# Patient Record
Sex: MALE | Race: WHITE | NOT HISPANIC OR LATINO | Employment: FULL TIME | URBAN - NONMETROPOLITAN AREA
[De-identification: names, ages, dates, MRNs, and addresses within clinical notes are randomized per-mention and may not be internally consistent; named-entity substitution may affect disease eponyms.]

---

## 2017-03-24 DIAGNOSIS — M79.672 BILATERAL FOOT PAIN: Primary | ICD-10-CM

## 2017-03-24 DIAGNOSIS — M79.671 BILATERAL FOOT PAIN: Primary | ICD-10-CM

## 2017-03-28 ENCOUNTER — OFFICE VISIT (OUTPATIENT)
Dept: ORTHOPEDIC SURGERY | Facility: CLINIC | Age: 32
End: 2017-03-28

## 2017-03-28 VITALS — HEIGHT: 66 IN | RESPIRATION RATE: 18 BRPM | WEIGHT: 285 LBS | BODY MASS INDEX: 45.8 KG/M2

## 2017-03-28 DIAGNOSIS — R22.43 MASS OF BOTH FEET: Primary | ICD-10-CM

## 2017-03-28 PROCEDURE — 99204 OFFICE O/P NEW MOD 45 MIN: CPT | Performed by: PODIATRIST

## 2017-03-28 RX ORDER — BUPRENORPHINE HYDROCHLORIDE, NALOXONE HYDROCHLORIDE 8; 2 MG/1; MG/1
FILM, SOLUBLE BUCCAL; SUBLINGUAL
Refills: 0 | COMMUNITY
Start: 2017-03-11 | End: 2017-05-10 | Stop reason: HOSPADM

## 2017-03-28 NOTE — PROGRESS NOTES
Subjective   Patient ID: J Luis Estrada is a 31 y.o. male   Pain, Consult, and Numbness of the Left Foot (Patient is being seen today at the request of Dr. MARIYA Logan.) and Pain and Numbness of the Right Foot   he presents the chief complaint of 2 areas of pain on the bottom of the foot.  He also complains of numbness and tingling and burning and odd sensations in his feet.  He denies being diabetic and states he's been tested for it.  He states his father and family have had neuropathy.  He states he really like to have these place in the bottom his feet removed.  States that he can't wear any shoes and is having to walk on the outside of his foot which is making other things hurt.    History of Present Illness    Review of Systems   Constitutional: Negative for diaphoresis, fever and unexpected weight change.   HENT: Negative for dental problem and sore throat.    Eyes: Negative for visual disturbance.   Respiratory: Negative for shortness of breath.    Cardiovascular: Negative for chest pain.   Gastrointestinal: Negative for abdominal pain, constipation, diarrhea, nausea and vomiting.   Genitourinary: Negative for difficulty urinating and frequency.   Musculoskeletal: Positive for arthralgias.   Neurological: Negative for headaches.   Hematological: Does not bruise/bleed easily.       Past Medical History:   Diagnosis Date   • DVT (deep venous thrombosis)    • Hypertension         Past Surgical History:   Procedure Laterality Date   • TONSILLECTOMY AND ADENOIDECTOMY         Allergies   Allergen Reactions   • Penicillins        @/ reviewed@    Objective   Physical Exam   Constitutional: He is oriented to person, place, and time. He appears well-developed and well-nourished.   HENT:   Head: Normocephalic.   Eyes: Pupils are equal, round, and reactive to light.   Neurological: He is alert and oriented to person, place, and time.   Skin: Skin is warm.   Psychiatric: He has a normal mood and affect. His  behavior is normal.   Vitals reviewed.    Ortho Exam  Ortho Exam  Bilateral Lower extremity exam:  Vascular: Pedal pulses are palpable, pedal hair is noted, capillary refill time is brisk  Neuro: Gross sensations intact, there doesn't appear to be any obvious loss of protective sensation  Derm: On the right plantar foot in the distal arch under the first metatarsal shaft there is a very large elevated, raised soft tissue mass that measures roughly 3 cm in diameter it seems to be attached to the underlying subcutaneous tissue but is mobile and moves.  It's not extremely firm and seems to slightly eliminate light.  On the left foot there is an area that appears to be 2 of these together potentially one larger multiloculated area under the first metatarsal and towards the first metatarsophalangeal joint sesamoids.  There also appears to be something resembling these under the fifth metatarsal head and base on the left foot.  MSK: He is very large in stature.  He has dorsal digital contractures of digits 2 through 5 bilaterally.  Range of motion 0 normal.  Manual muscle testing is normal.    Assessment/Plan  neuropathy of unknown origin, bilateral foot masses/tumors  Independent Review of Radiographic Studies:      Laboratory and Other Studies:     Medical Decision Making:        Procedures  [] No procedures were performed in office today.    J Luis was seen today for pain, consult, numbness, pain and numbness.    Diagnoses and all orders for this visit:    Mass of both feet  -     MRI Foot Left Without Contrast; Future        Recommendations/Plan:  Discussed with him that I would like to look into the cause of his neuropathy.  This point he states he's not diabetic but may need a more involved workup for that.  He has a history of DVT so that will have to be managed if we do continue with the potential surgery.  I recommend him first that we MRI the left foot with contrast to try to get an idea of exactly what these  are.  If these are potentially rheumatoid nodules I think it's a good idea fist send him for that workup.  If they're just ganglion cyst in nature or lesions of that sort we can most likely proceed with excision.  I explained him the difficulty in getting plantar foot wounds to heal and prolonged nonweightbearing postoperatively.  I'll send him for MRI and see him back after that.  I offered him fracture boots to try and if these will help offload his feet make him more comfortable in the meantime he can have him.    No Follow-up on file.  Patient agreeable to call or return sooner for any concerns.

## 2017-04-14 ENCOUNTER — HOSPITAL ENCOUNTER (OUTPATIENT)
Dept: MRI IMAGING | Facility: HOSPITAL | Age: 32
Discharge: HOME | End: 2017-04-14
Attending: PODIATRIST | Admitting: PODIATRIST

## 2017-04-14 DIAGNOSIS — R22.43 MASS OF BOTH FEET: ICD-10-CM

## 2017-04-14 PROCEDURE — 73718 MRI LOWER EXTREMITY W/O DYE: CPT

## 2017-04-20 ENCOUNTER — OFFICE VISIT (OUTPATIENT)
Dept: ORTHOPEDIC SURGERY | Facility: CLINIC | Age: 32
End: 2017-04-20

## 2017-04-20 VITALS — WEIGHT: 307 LBS | BODY MASS INDEX: 35.52 KG/M2 | HEIGHT: 78 IN | RESPIRATION RATE: 18 BRPM

## 2017-04-20 DIAGNOSIS — R22.43 MASS OF BOTH FEET: Primary | ICD-10-CM

## 2017-04-20 DIAGNOSIS — I82.433 DEEP VEIN THROMBOSIS (DVT) OF POPLITEAL VEIN OF BOTH LOWER EXTREMITIES, UNSPECIFIED CHRONICITY (HCC): ICD-10-CM

## 2017-04-20 DIAGNOSIS — M72.2 PLANTAR FASCIAL FIBROMATOSIS OF BOTH FEET: ICD-10-CM

## 2017-04-20 DIAGNOSIS — G62.9 NEUROPATHY: ICD-10-CM

## 2017-04-20 PROCEDURE — 99214 OFFICE O/P EST MOD 30 MIN: CPT | Performed by: PODIATRIST

## 2017-04-20 RX ORDER — PREDNISONE 10 MG/1
TABLET ORAL
COMMUNITY
Start: 2017-04-13 | End: 2017-05-03

## 2017-04-20 RX ORDER — SODIUM CHLORIDE 0.9 % (FLUSH) 0.9 %
1-10 SYRINGE (ML) INJECTION AS NEEDED
Status: CANCELLED | OUTPATIENT
Start: 2017-04-20

## 2017-04-20 RX ORDER — LAMOTRIGINE 150 MG/1
100 TABLET ORAL DAILY
COMMUNITY
Start: 2017-04-12

## 2017-04-20 RX ORDER — SODIUM CHLORIDE, SODIUM LACTATE, POTASSIUM CHLORIDE, CALCIUM CHLORIDE 600; 310; 30; 20 MG/100ML; MG/100ML; MG/100ML; MG/100ML
100 INJECTION, SOLUTION INTRAVENOUS CONTINUOUS
Status: CANCELLED | OUTPATIENT
Start: 2017-04-20

## 2017-04-20 NOTE — PROGRESS NOTES
Subjective   Patient ID: J Luis Estrada is a 31 y.o. male   Follow-up and Pain of the Right Foot and Follow-up and Pain of the Left Foot   comes in today for follow-up on his left foot masses as well as his neuropathy.  His mother comes in with him today.  I had artery talk to her on the phone prior to her coming in.  He's had blood work from Dr. Mo.  still requesting that we remove the knots out of the bottom of his foot.    History of Present Illness    Review of Systems   Constitutional: Negative for fever.   HENT: Negative for voice change.    Eyes: Negative for visual disturbance.   Respiratory: Negative for shortness of breath.    Cardiovascular: Negative for chest pain.   Gastrointestinal: Negative for abdominal distention and abdominal pain.   Genitourinary: Negative for dysuria.   Musculoskeletal: Positive for arthralgias. Negative for gait problem and joint swelling.   Skin: Negative for rash.   Neurological: Negative for speech difficulty.   Hematological: Does not bruise/bleed easily.   Psychiatric/Behavioral: Negative for confusion.       Past Medical History:   Diagnosis Date   • Bipolar affective disorder, currently active     ON MEDICATION   • DVT (deep venous thrombosis)    • Hypertension    • Neuropathy         Past Surgical History:   Procedure Laterality Date   • ADENOIDECTOMY     • TONSILLECTOMY AND ADENOIDECTOMY         Allergies   Allergen Reactions   • Penicillins            Objective   Physical Exam   Constitutional: He is oriented to person, place, and time. He appears well-developed and well-nourished.   HENT:   Head: Normocephalic.   Eyes: Pupils are equal, round, and reactive to light.   Neurological: He is alert and oriented to person, place, and time.   Skin: Skin is warm.   Psychiatric: He has a normal mood and affect. His behavior is normal.   Vitals reviewed.   heart: Regular rate and rhythm  Lungs: CTA bilaterally  Name: Obese, nontender, soft  PERRLA  Cranial nerves II  through XII intact  Ortho Exam  Ortho Exam  Bilateral Lower extremity exam:  Vascular: Pedal pulses are palpable, pedal hair is noted, capillary refill time is brisk  Neuro: Gross sensations intact, there doesn't appear to be any obvious loss of protective sensation  Derm: On the right plantar foot in the distal arch under the first metatarsal shaft there is a very large elevated, raised soft tissue mass that measures roughly 3 cm in diameter it seems to be attached to the underlying subcutaneous tissue but is mobile and moves. It's not extremely firm and seems to slightly eliminate light. On the left foot there is an area that appears to be 2 of these together potentially one larger multiloculated area under the first metatarsal and towards the first metatarsophalangeal joint sesamoids. There also appears to be something resembling these under the fifth metatarsal head and base on the left foot.  MSK: He is very large in stature. He has dorsal digital contractures of digits 2 through 5 bilaterally. Range of motion 0 normal. Manual muscle testing is normal  Assessment/Plan  bilateral plantar fibromatosis, neuropathy of unknown origin  Independent Review of Radiographic Studies:    His MRI the left foot shows multiple thickenings within the plantar fascia that appear to be multiple large plantar fibromas  Laboratory and Other Studies:   I reviewed his labs from Dr. Logan.  It appears that there is no obvious diabetes.  His hemoglobin A1c is a 5.6.  Medical Decision Making:        Procedures  [] No procedures were performed in office today.    J Luis was seen today for follow-up, pain, follow-up and pain.    Diagnoses and all orders for this visit:    Mass of both feet  -     Case Request; Standing  -     CBC and Differential; Future  -     Comprehensive metabolic panel; Future  -     sodium chloride 0.9 % flush 1-10 mL; Infuse 1-10 mL into a venous catheter As Needed for Line Care.  -     lactated ringers infusion;  Infuse 100 mL/hr into a venous catheter Continuous.  -     clindamycin (CLEOCIN) 900 mg in dextrose (D5W) 5 % 100 mL IVPB; Infuse 900 mg into a venous catheter 1 (One) Time.  -     Case Request    Plantar fascial fibromatosis of both feet    Neuropathy  -     Ambulatory Referral to Neurology    Deep vein thrombosis (DVT) of popliteal vein of both lower extremities, unspecified chronicity  -     enoxaparin (LOVENOX) injection 40 mg; Inject 0.27 mL under the skin Daily.    Other orders  -     Follow Anesthesia Guidelines / Standing Orders; Future  -     Provide instructions to patient regarding NPO status  -     Obtain informed consent  -     Clorhexidine skin prep  -     Follow Anesthesia Guidelines / Standing Orders; Standing  -     Verify NPO Status; Standing  -     Verify informed consent; Standing  -     MONSERRAT hose- To be placed on patient in pre-op; Standing  -     SCD (sequential compression device)- to be placed on patient in Pre-op; Standing  -     Obtain informed consent (if not collected inpatient or PAT); Standing  -     Insert Peripheral IV; Standing  -     Saline Lock & Maintain IV Access; Standing          Recommendations/Plan:  His mother finally made it today and we sat down had a long discussion with all his problems.  We also reviewed his MRI.  In regards to the neuropathy I discussed with them all the different causes a neuropathy.  I am going to send him to Dr. Ventura to see if he has any additional or further recommendations on his neuropathy I did explain that this may be idiopathic in can also be calls for multiple other underlying health issues.  He's very adamant about having the masses in the bottom of his feet removed due to pain.  I had a long discussion with him as well as his mother about the surgical procedure and the potential complications of wound healing.  I explained the need to be nonweightbearing with a splint, crutches, knee scooter or walker.  They state they already made  arrangements in his house to help limit his walking.  We discussed all the risks versus benefits as well as potential complications.  Discussed recurrence.  If I can find somewhere close and convenient for him I will send him for a few doses of radiation afterwards to help prevent recurrence.  They seemed express understanding and are agreeable and wished to proceed with surgery.  I'll have to put him on DVT prophylaxis due to his history of DVTs.  He is on Suboxone Soma pain management with him the most likely be difficult.  Return post op.  Patient agreeable to call or return sooner for any concerns.

## 2017-05-03 ENCOUNTER — APPOINTMENT (OUTPATIENT)
Dept: PREADMISSION TESTING | Facility: HOSPITAL | Age: 32
End: 2017-05-03

## 2017-05-03 VITALS — HEIGHT: 78 IN | BODY MASS INDEX: 33.55 KG/M2 | WEIGHT: 290 LBS

## 2017-05-03 DIAGNOSIS — R22.43 MASS OF BOTH FEET: ICD-10-CM

## 2017-05-03 LAB
ALBUMIN SERPL-MCNC: 4.6 G/DL (ref 3.5–5)
ALBUMIN/GLOB SERPL: 1 G/DL (ref 1–2)
ALP SERPL-CCNC: 92 U/L (ref 38–126)
ALT SERPL W P-5'-P-CCNC: 133 U/L (ref 13–69)
ANION GAP SERPL CALCULATED.3IONS-SCNC: 15.7 MMOL/L
AST SERPL-CCNC: 74 U/L (ref 15–46)
BASOPHILS # BLD AUTO: 0.04 10*3/MM3 (ref 0–0.2)
BASOPHILS NFR BLD AUTO: 0.6 % (ref 0–2.5)
BILIRUB SERPL-MCNC: 0.7 MG/DL (ref 0.2–1.3)
BUN BLD-MCNC: 14 MG/DL (ref 7–20)
BUN/CREAT SERPL: 14 (ref 6.3–21.9)
CALCIUM SPEC-SCNC: 9.7 MG/DL (ref 8.4–10.2)
CHLORIDE SERPL-SCNC: 104 MMOL/L (ref 98–107)
CO2 SERPL-SCNC: 28 MMOL/L (ref 26–30)
CREAT BLD-MCNC: 1 MG/DL (ref 0.6–1.3)
DEPRECATED RDW RBC AUTO: 46.9 FL (ref 37–54)
EOSINOPHIL # BLD AUTO: 0.2 10*3/MM3 (ref 0–0.7)
EOSINOPHIL NFR BLD AUTO: 2.9 % (ref 0–7)
ERYTHROCYTE [DISTWIDTH] IN BLOOD BY AUTOMATED COUNT: 13.8 % (ref 11.5–14.5)
GFR SERPL CREATININE-BSD FRML MDRD: 86 ML/MIN/1.73
GLOBULIN UR ELPH-MCNC: 4.4 GM/DL
GLUCOSE BLD-MCNC: 93 MG/DL (ref 74–98)
HCT VFR BLD AUTO: 47.6 % (ref 42–52)
HGB BLD-MCNC: 15.6 G/DL (ref 14–18)
IMM GRANULOCYTES # BLD: 0.03 10*3/MM3 (ref 0–0.06)
IMM GRANULOCYTES NFR BLD: 0.4 % (ref 0–0.6)
LYMPHOCYTES # BLD AUTO: 2.17 10*3/MM3 (ref 0.6–3.4)
LYMPHOCYTES NFR BLD AUTO: 31.3 % (ref 10–50)
MCH RBC QN AUTO: 30.1 PG (ref 27–31)
MCHC RBC AUTO-ENTMCNC: 32.8 G/DL (ref 30–37)
MCV RBC AUTO: 91.7 FL (ref 80–94)
MONOCYTES # BLD AUTO: 0.67 10*3/MM3 (ref 0–0.9)
MONOCYTES NFR BLD AUTO: 9.7 % (ref 0–12)
NEUTROPHILS # BLD AUTO: 3.82 10*3/MM3 (ref 2–6.9)
NEUTROPHILS NFR BLD AUTO: 55.1 % (ref 37–80)
NRBC BLD MANUAL-RTO: 0 /100 WBC (ref 0–0)
PLATELET # BLD AUTO: 214 10*3/MM3 (ref 130–400)
PMV BLD AUTO: 9.6 FL (ref 6–12)
POTASSIUM BLD-SCNC: 4.7 MMOL/L (ref 3.5–5.1)
PROT SERPL-MCNC: 9 G/DL (ref 6.3–8.2)
RBC # BLD AUTO: 5.19 10*6/MM3 (ref 4.7–6.1)
SODIUM BLD-SCNC: 143 MMOL/L (ref 137–145)
WBC NRBC COR # BLD: 6.93 10*3/MM3 (ref 4.8–10.8)

## 2017-05-03 PROCEDURE — 85025 COMPLETE CBC W/AUTO DIFF WBC: CPT | Performed by: PODIATRIST

## 2017-05-03 PROCEDURE — 36415 COLL VENOUS BLD VENIPUNCTURE: CPT

## 2017-05-03 PROCEDURE — 80053 COMPREHEN METABOLIC PANEL: CPT | Performed by: PODIATRIST

## 2017-05-03 PROCEDURE — 93005 ELECTROCARDIOGRAM TRACING: CPT | Performed by: PODIATRIST

## 2017-05-03 RX ORDER — LORAZEPAM 0.5 MG/1
0.5 TABLET ORAL EVERY 8 HOURS PRN
COMMUNITY

## 2017-05-10 ENCOUNTER — ANESTHESIA (OUTPATIENT)
Dept: PERIOP | Facility: HOSPITAL | Age: 32
End: 2017-05-10

## 2017-05-10 ENCOUNTER — ANESTHESIA EVENT (OUTPATIENT)
Dept: PERIOP | Facility: HOSPITAL | Age: 32
End: 2017-05-10

## 2017-05-10 ENCOUNTER — HOSPITAL ENCOUNTER (OUTPATIENT)
Facility: HOSPITAL | Age: 32
Setting detail: HOSPITAL OUTPATIENT SURGERY
Discharge: HOME | End: 2017-05-10
Attending: PODIATRIST | Admitting: PODIATRIST

## 2017-05-10 VITALS
HEART RATE: 71 BPM | OXYGEN SATURATION: 95 % | TEMPERATURE: 99.8 F | RESPIRATION RATE: 16 BRPM | SYSTOLIC BLOOD PRESSURE: 95 MMHG | DIASTOLIC BLOOD PRESSURE: 58 MMHG

## 2017-05-10 DIAGNOSIS — R22.43 MASS OF BOTH FEET: ICD-10-CM

## 2017-05-10 PROCEDURE — 28041 EXC FOOT/TOE TUM DEP 1.5CM/>: CPT | Performed by: PODIATRIST

## 2017-05-10 PROCEDURE — 25010000002 ONDANSETRON PER 1 MG: Performed by: NURSE ANESTHETIST, CERTIFIED REGISTERED

## 2017-05-10 PROCEDURE — 25010000002 KETOROLAC TROMETHAMINE PER 15 MG: Performed by: NURSE ANESTHETIST, CERTIFIED REGISTERED

## 2017-05-10 PROCEDURE — 25010000002 MIDAZOLAM PER 1 MG: Performed by: NURSE ANESTHETIST, CERTIFIED REGISTERED

## 2017-05-10 PROCEDURE — 25010000002 PROPOFOL 200 MG/20ML EMULSION: Performed by: NURSE ANESTHETIST, CERTIFIED REGISTERED

## 2017-05-10 PROCEDURE — 25010000002 FENTANYL CITRATE (PF) 100 MCG/2ML SOLUTION: Performed by: NURSE ANESTHETIST, CERTIFIED REGISTERED

## 2017-05-10 PROCEDURE — 25010000002 DEXAMETHASONE PER 1 MG: Performed by: NURSE ANESTHETIST, CERTIFIED REGISTERED

## 2017-05-10 RX ORDER — BACITRACIN 50000 [IU]/1
INJECTION, POWDER, FOR SOLUTION INTRAMUSCULAR AS NEEDED
Status: DISCONTINUED | OUTPATIENT
Start: 2017-05-10 | End: 2017-05-10 | Stop reason: HOSPADM

## 2017-05-10 RX ORDER — ONDANSETRON 2 MG/ML
4 INJECTION INTRAMUSCULAR; INTRAVENOUS ONCE AS NEEDED
Status: DISCONTINUED | OUTPATIENT
Start: 2017-05-10 | End: 2017-05-10 | Stop reason: HOSPADM

## 2017-05-10 RX ORDER — OXYCODONE AND ACETAMINOPHEN 7.5; 325 MG/1; MG/1
1 TABLET ORAL EVERY 6 HOURS PRN
Qty: 30 TABLET | Refills: 0 | Status: SHIPPED | OUTPATIENT
Start: 2017-05-10 | End: 2018-03-16

## 2017-05-10 RX ORDER — BUPIVACAINE HYDROCHLORIDE 5 MG/ML
INJECTION, SOLUTION EPIDURAL; INTRACAUDAL AS NEEDED
Status: DISCONTINUED | OUTPATIENT
Start: 2017-05-10 | End: 2017-05-10 | Stop reason: HOSPADM

## 2017-05-10 RX ORDER — MIDAZOLAM HYDROCHLORIDE 1 MG/ML
INJECTION INTRAMUSCULAR; INTRAVENOUS AS NEEDED
Status: DISCONTINUED | OUTPATIENT
Start: 2017-05-10 | End: 2017-05-10 | Stop reason: SURG

## 2017-05-10 RX ORDER — SODIUM CHLORIDE, SODIUM LACTATE, POTASSIUM CHLORIDE, CALCIUM CHLORIDE 600; 310; 30; 20 MG/100ML; MG/100ML; MG/100ML; MG/100ML
100 INJECTION, SOLUTION INTRAVENOUS CONTINUOUS
Status: DISCONTINUED | OUTPATIENT
Start: 2017-05-10 | End: 2017-05-10 | Stop reason: HOSPADM

## 2017-05-10 RX ORDER — FENTANYL CITRATE 50 UG/ML
INJECTION, SOLUTION INTRAMUSCULAR; INTRAVENOUS AS NEEDED
Status: DISCONTINUED | OUTPATIENT
Start: 2017-05-10 | End: 2017-05-10 | Stop reason: SURG

## 2017-05-10 RX ORDER — PROMETHAZINE HYDROCHLORIDE 25 MG/1
25 SUPPOSITORY RECTAL ONCE AS NEEDED
Status: DISCONTINUED | OUTPATIENT
Start: 2017-05-10 | End: 2017-05-10 | Stop reason: HOSPADM

## 2017-05-10 RX ORDER — PROPOFOL 10 MG/ML
INJECTION, EMULSION INTRAVENOUS AS NEEDED
Status: DISCONTINUED | OUTPATIENT
Start: 2017-05-10 | End: 2017-05-10 | Stop reason: SURG

## 2017-05-10 RX ORDER — OXYCODONE HYDROCHLORIDE AND ACETAMINOPHEN 5; 325 MG/1; MG/1
TABLET ORAL
Status: COMPLETED
Start: 2017-05-10 | End: 2017-05-10

## 2017-05-10 RX ORDER — DEXAMETHASONE SODIUM PHOSPHATE 4 MG/ML
INJECTION, SOLUTION INTRA-ARTICULAR; INTRALESIONAL; INTRAMUSCULAR; INTRAVENOUS; SOFT TISSUE AS NEEDED
Status: DISCONTINUED | OUTPATIENT
Start: 2017-05-10 | End: 2017-05-10 | Stop reason: SURG

## 2017-05-10 RX ORDER — PROMETHAZINE HYDROCHLORIDE 12.5 MG/1
25 TABLET ORAL ONCE AS NEEDED
Status: DISCONTINUED | OUTPATIENT
Start: 2017-05-10 | End: 2017-05-10 | Stop reason: HOSPADM

## 2017-05-10 RX ORDER — LIDOCAINE HYDROCHLORIDE 10 MG/ML
INJECTION, SOLUTION INFILTRATION; PERINEURAL AS NEEDED
Status: DISCONTINUED | OUTPATIENT
Start: 2017-05-10 | End: 2017-05-10 | Stop reason: HOSPADM

## 2017-05-10 RX ORDER — PROMETHAZINE HYDROCHLORIDE 25 MG/ML
6.25 INJECTION, SOLUTION INTRAMUSCULAR; INTRAVENOUS ONCE AS NEEDED
Status: DISCONTINUED | OUTPATIENT
Start: 2017-05-10 | End: 2017-05-10 | Stop reason: HOSPADM

## 2017-05-10 RX ORDER — ONDANSETRON 2 MG/ML
INJECTION INTRAMUSCULAR; INTRAVENOUS AS NEEDED
Status: DISCONTINUED | OUTPATIENT
Start: 2017-05-10 | End: 2017-05-10 | Stop reason: SURG

## 2017-05-10 RX ORDER — MEPERIDINE HYDROCHLORIDE 25 MG/ML
12.5 INJECTION INTRAMUSCULAR; INTRAVENOUS; SUBCUTANEOUS
Status: DISCONTINUED | OUTPATIENT
Start: 2017-05-10 | End: 2017-05-10 | Stop reason: HOSPADM

## 2017-05-10 RX ORDER — KETOROLAC TROMETHAMINE 30 MG/ML
INJECTION, SOLUTION INTRAMUSCULAR; INTRAVENOUS AS NEEDED
Status: DISCONTINUED | OUTPATIENT
Start: 2017-05-10 | End: 2017-05-10 | Stop reason: SURG

## 2017-05-10 RX ORDER — SODIUM CHLORIDE 0.9 % (FLUSH) 0.9 %
1-10 SYRINGE (ML) INJECTION AS NEEDED
Status: DISCONTINUED | OUTPATIENT
Start: 2017-05-10 | End: 2017-05-10 | Stop reason: HOSPADM

## 2017-05-10 RX ORDER — BUPIVACAINE HYDROCHLORIDE 5 MG/ML
INJECTION, SOLUTION EPIDURAL; INTRACAUDAL
Status: COMPLETED
Start: 2017-05-10 | End: 2017-05-10

## 2017-05-10 RX ADMIN — DEXAMETHASONE SODIUM PHOSPHATE 4 MG: 4 INJECTION, SOLUTION INTRAMUSCULAR; INTRAVENOUS at 09:21

## 2017-05-10 RX ADMIN — SODIUM CHLORIDE 900 MG: 9 INJECTION, SOLUTION INTRAVENOUS at 08:39

## 2017-05-10 RX ADMIN — OXYCODONE HYDROCHLORIDE AND ACETAMINOPHEN 2 TABLET: 5; 325 TABLET ORAL at 11:21

## 2017-05-10 RX ADMIN — LIDOCAINE HYDROCHLORIDE 100 MG: 20 INJECTION, SOLUTION INTRAVENOUS at 08:37

## 2017-05-10 RX ADMIN — MIDAZOLAM HYDROCHLORIDE 2 MG: 1 INJECTION, SOLUTION INTRAMUSCULAR; INTRAVENOUS at 08:35

## 2017-05-10 RX ADMIN — KETOROLAC TROMETHAMINE 30 MG: 30 INJECTION, SOLUTION INTRAMUSCULAR at 09:21

## 2017-05-10 RX ADMIN — FENTANYL CITRATE 100 MCG: 50 INJECTION, SOLUTION INTRAMUSCULAR; INTRAVENOUS at 08:35

## 2017-05-10 RX ADMIN — PROPOFOL 250 MG: 10 INJECTION, EMULSION INTRAVENOUS at 08:37

## 2017-05-10 RX ADMIN — SODIUM CHLORIDE, POTASSIUM CHLORIDE, SODIUM LACTATE AND CALCIUM CHLORIDE 100 ML/HR: 600; 310; 30; 20 INJECTION, SOLUTION INTRAVENOUS at 08:29

## 2017-05-10 RX ADMIN — ONDANSETRON 4 MG: 2 INJECTION INTRAMUSCULAR; INTRAVENOUS at 09:21

## 2017-05-10 RX ADMIN — BUPIVACAINE HYDROCHLORIDE 23 ML: 5 INJECTION, SOLUTION EPIDURAL; INTRACAUDAL; PERINEURAL at 09:33

## 2017-05-19 LAB
LAB AP CASE REPORT: NORMAL
Lab: NORMAL
PATH REPORT.FINAL DX SPEC: NORMAL

## 2017-05-25 ENCOUNTER — OFFICE VISIT (OUTPATIENT)
Dept: ORTHOPEDIC SURGERY | Facility: CLINIC | Age: 32
End: 2017-05-25

## 2017-05-25 VITALS — WEIGHT: 290 LBS | BODY MASS INDEX: 33.55 KG/M2 | HEIGHT: 78 IN | RESPIRATION RATE: 18 BRPM

## 2017-05-25 DIAGNOSIS — M72.2 PLANTAR FASCIAL FIBROMATOSIS OF BOTH FEET: Primary | ICD-10-CM

## 2017-05-25 PROCEDURE — 99024 POSTOP FOLLOW-UP VISIT: CPT | Performed by: PODIATRIST

## 2017-05-25 PROCEDURE — 29515 APPLICATION SHORT LEG SPLINT: CPT | Performed by: PODIATRIST

## 2017-05-25 RX ORDER — BUPRENORPHINE HYDROCHLORIDE, NALOXONE HYDROCHLORIDE 8; 2 MG/1; MG/1
FILM, SOLUBLE BUCCAL; SUBLINGUAL
COMMUNITY
Start: 2017-05-22

## 2017-05-30 ENCOUNTER — TELEPHONE (OUTPATIENT)
Dept: ORTHOPEDIC SURGERY | Facility: CLINIC | Age: 32
End: 2017-05-30

## 2017-06-08 ENCOUNTER — OFFICE VISIT (OUTPATIENT)
Dept: ORTHOPEDIC SURGERY | Facility: CLINIC | Age: 32
End: 2017-06-08

## 2017-06-08 VITALS — BODY MASS INDEX: 33.55 KG/M2 | RESPIRATION RATE: 18 BRPM | WEIGHT: 290 LBS | HEIGHT: 78 IN

## 2017-06-08 DIAGNOSIS — R22.43 MASS OF BOTH FEET: Primary | ICD-10-CM

## 2017-06-08 DIAGNOSIS — G62.9 NEUROPATHY: ICD-10-CM

## 2017-06-08 PROCEDURE — 99024 POSTOP FOLLOW-UP VISIT: CPT | Performed by: PODIATRIST

## 2017-06-08 NOTE — PROGRESS NOTES
Subjective   Patient ID: J Luis Estrada is a 31 y.o. male STATUS POST:  Pain and Post-op of the Left Foot (05/10/17 Left foot soft tissue mass of a plantar soft tissue mass that measured 7 x 3 cm from deep within the plantar foot, encompassing the musculature and plantar fascia./ )  Returns for follow-up for his bilateral foot lesions.  He's now almost 1 month status post excision of large left foot plantar fibroma.  Comes in with crutches and a splint clean dry and intact to the left foot.  Still questioning about his right foot and wants a red and when we are going to be able to do it.    History of Present Illness    Review of Systems   Constitutional: Negative for fever.   HENT: Negative for voice change.    Eyes: Negative for visual disturbance.   Respiratory: Negative for shortness of breath.    Cardiovascular: Negative for chest pain.   Gastrointestinal: Negative for abdominal distention and abdominal pain.   Genitourinary: Negative for dysuria.   Musculoskeletal: Positive for arthralgias. Negative for gait problem and joint swelling.   Skin: Negative for rash.   Neurological: Negative for speech difficulty.   Hematological: Does not bruise/bleed easily.   Psychiatric/Behavioral: Negative for confusion.       Past Medical History:   Diagnosis Date   • Bipolar affective disorder, currently active     ON MEDICATION   • DVT (deep venous thrombosis)     RIGHT LEG -2012   • Hypertension     PT DENIES ANY HTN.  BUT STATES IT DOES GO UP AT TIMES.   • Neuropathy    • Seizure     LAST ONE WAS 4/2017.  STATES NOT ON ANY MEDICATION FOR.     • Uses contact lenses         Past Surgical History:   Procedure Laterality Date   • ADENOIDECTOMY     • EXCISION MASS LEG Left 5/10/2017    Procedure: Left foot soft tissue mass/plantar fibroma excision;  Surgeon: Lucius Olguin DPM;  Location: [facility];  Service:    • TONSILLECTOMY AND ADENOIDECTOMY     • WISDOM TOOTH EXTRACTION         Allergies   Allergen Reactions    • Penicillins        Objective   Physical Exam   Constitutional: He is oriented to person, place, and time. He appears well-developed and well-nourished.   HENT:   Head: Normocephalic.   Eyes: Pupils are equal, round, and reactive to light.   Neurological: He is alert and oriented to person, place, and time.   Skin: Skin is warm.   Psychiatric: He has a normal mood and affect. His behavior is normal.   Vitals reviewed.    Ortho Exam  Ortho Exam  Incisions are clean dry and intact, well coapted, no sign of dehiscence  Sutures are intact and no sign of infection is present  He's neurovascularly intact to the left foot his incision appears to be well coapted and shows no complications actually fairly smooth with no significant fibrosis.    I also did take a look at his right foot where the large plantar medial forefoot mass is somewhat erythematous and tenderness to palpation but shows no concern #of infection this appears to be just due to increased weightbearing    Assessment/Plan  status post 1 month from left foot plantar fibroma excision, right foot plantar fibroma  Independent Review of Radiographic Studies:      Laboratory and Other Studies:     Medical Decision Making:        Procedures  [] No procedures were performed in office today.    J Luis was seen today for pain and post-op.    Diagnoses and all orders for this visit:    Mass of both feet    Neuropathy        Recommendations/Plan:  The patient's sutures were removed today and Steri-Strips and Mastisol were placed.  They can begin getting the foot wet in the shower now.  We will continue to weight-bear as tolerated in the Darco shoe/cam boot.  They should continue Rice as needed.  Elevation as needed.  Compression is needed.  He'll follow-up in 3 weeks for fu.  he'll call with any signs or complaints of infection.  Also, send him a prescription for a compounded scar cream for the left foot to start using to help thin out the incision.  I explained him  that on the right sides probably due to his increased weightbearing and pressure from having to stay off of the left side.  I told him that we'll give the left side another 3-4 weeks to heal and then consider doing the right.  He requested me get him a prescription for Neurontin and I explained that this is about to become controlled substance never given him this before so recommend he speak with his primary care physician or his pain clinic about this.    Return in about 3 weeks (around 6/29/2017).  Patient agreeable to call or return sooner for any concerns.

## 2017-07-19 ENCOUNTER — OFFICE VISIT (OUTPATIENT)
Dept: NEUROLOGY | Facility: CLINIC | Age: 32
End: 2017-07-19

## 2017-07-19 VITALS
HEIGHT: 78 IN | DIASTOLIC BLOOD PRESSURE: 64 MMHG | HEART RATE: 88 BPM | OXYGEN SATURATION: 98 % | SYSTOLIC BLOOD PRESSURE: 96 MMHG | WEIGHT: 299 LBS | BODY MASS INDEX: 34.59 KG/M2

## 2017-07-19 DIAGNOSIS — D17.24 LIPOMA OF LEFT LOWER EXTREMITY: ICD-10-CM

## 2017-07-19 DIAGNOSIS — G57.62 MEDIAL PLANTAR NEUROPATHY, LEFT: Primary | ICD-10-CM

## 2017-07-19 DIAGNOSIS — D17.23 LIPOMA OF RIGHT LOWER EXTREMITY: ICD-10-CM

## 2017-07-19 DIAGNOSIS — G57.61 MEDIAL PLANTAR NEUROPATHY, RIGHT: ICD-10-CM

## 2017-07-19 PROCEDURE — 99243 OFF/OP CNSLTJ NEW/EST LOW 30: CPT | Performed by: PSYCHIATRY & NEUROLOGY

## 2017-07-19 RX ORDER — DULOXETIN HYDROCHLORIDE 20 MG/1
20 CAPSULE, DELAYED RELEASE ORAL DAILY
Qty: 30 CAPSULE | Refills: 0 | Status: SHIPPED | OUTPATIENT
Start: 2017-07-19 | End: 2018-07-19

## 2017-07-19 NOTE — PROGRESS NOTES
[facility] NEUROLOGY [place] CONSULTATION   History of Present Illness     Date: 7/19/2017    Patient Identification  J Luis Estrada is a 31 y.o. male.    Patient information was obtained from patient.  History/Exam limitations: none.    CONSULTATION requested by: Marquez Logan MD      Chief Complaint   Establish Care (Pt is in office to [facility] care, referred by Dr Olguin for neuropathy ) and Extremity Pain (Pt has c/o pain and burning in feet and legs, states sx started approx 3 years ago. shaking legs can improve sx )      History of Present Illness   Patient presents with a 3 year history of burning pain and delayed sensation in his feet, particularly on the bottom medial portion.  He has a history of lipomas in the same distribution, three of which were recently removed from the left foot.  Patient plans to have another removed from the right foot at a later date.  He notes a worsening of the pain in the right foot following the surgery due to added weight bearing.    PMH:   Past Medical History:   Diagnosis Date   • Anxiety    • Bipolar affective disorder, currently active     ON MEDICATION   • DVT (deep venous thrombosis)     RIGHT LEG -2012   • Hypertension     PT DENIES ANY HTN.  BUT STATES IT DOES GO UP AT TIMES.   • Migraine    • Neuropathy    • Seizure     LAST ONE WAS 4/2017.  STATES NOT ON ANY MEDICATION FOR.     • Uses contact lenses        Past Surgical History:   Past Surgical History:   Procedure Laterality Date   • ADENOIDECTOMY     • EXCISION MASS LEG Left 5/10/2017    Procedure: Left foot soft tissue mass/plantar fibroma excision;  Surgeon: Lucius Olguin DPM;  Location: [facility];  Service:    • TONSILLECTOMY AND ADENOIDECTOMY     • WISDOM TOOTH EXTRACTION         Family Hisotry:   Family History   Problem Relation Age of Onset   • Gout Other    • Osteoarthritis Other    • Rheum arthritis Other    • Diabetes Other        Social History:   Social History     Social History   •  Marital status:      Spouse name: N/A   • Number of children: N/A   • Years of education: N/A     Occupational History   • Not on file.     Social History Main Topics   • Smoking status: Current Every Day Smoker     Packs/day: 1.00     Years: 14.00     Types: Cigarettes   • Smokeless tobacco: Never Used   • Alcohol use Yes      Comment: rarely   • Drug use: No      Comment: previously.  2013 WAS LAST USE.     • Sexual activity: Defer     Other Topics Concern   • Not on file     Social History Narrative       Medications:   Current Outpatient Prescriptions   Medication Sig Dispense Refill   • lamoTRIgine (LaMICtal) 150 MG tablet Take 150 mg by mouth Daily.     • LORazepam (ATIVAN) 0.5 MG tablet Take 0.5 mg by mouth Every 8 (Eight) Hours As Needed for Anxiety.     • SUBOXONE 8-2 MG film film      • triamcinolone (KENALOG) 0.1 % ointment Apply  topically 2 (Two) Times a Day. 30 g 0   • Unable to find 1 each 1 (One) Time. Med Name: [id number] CREAM     • DULoxetine (CYMBALTA) 20 MG capsule Take 1 capsule by mouth Daily. 30 capsule 0   • oxyCODONE-acetaminophen (PERCOCET) 7.5-325 MG per tablet Take 1 tablet by mouth Every 6 (Six) Hours As Needed (Pain). 30 tablet 0     No current facility-administered medications for this visit.        Allergy:   Allergies   Allergen Reactions   • Penicillins        Review of Systems:  Review of Systems   Constitutional: Negative for chills and fever.   HENT: Negative for congestion, ear pain, hearing loss, rhinorrhea and sore throat.    Eyes: Negative for pain, discharge and redness.   Respiratory: Negative for cough, shortness of breath, wheezing and stridor.    Cardiovascular: Negative for chest pain, palpitations and leg swelling.   Gastrointestinal: Negative for abdominal pain, constipation, nausea and vomiting.   Endocrine: Negative for cold intolerance, heat intolerance and polyphagia.   Genitourinary: Negative for dysuria, flank pain, frequency and urgency.  "  Musculoskeletal: Negative for joint swelling, myalgias, neck pain and neck stiffness.   Skin: Negative for pallor, rash and wound.   Allergic/Immunologic: Negative for environmental allergies.   Neurological: Positive for numbness. Negative for dizziness, tremors, seizures, syncope, facial asymmetry, speech difficulty, weakness, light-headedness and headaches.   Hematological: Negative for adenopathy.   Psychiatric/Behavioral: Negative for confusion and hallucinations. The patient is nervous/anxious.        Physical Exam     Vitals:    07/19/17 1111   BP: 96/64   Pulse: 88   SpO2: 98%   Weight: 299 lb (136 kg)   Height: 78\" (198.1 cm)     GENERAL: Patient is pleasant, cooperative, appears to be stated age.  Body habitus is endomorphic.  SKIN AND EXTREMITIES:  No skin rashes or lesions are noted.  No cyanosis, clubbing or edema of the extremities.    HEAD:  Head is normocephalic and atraumatic.    NECK: Neck are non-tender without thyromegaly or adenopathy.  Carotic upstrokes are 1+/4.  No cranial or cervical bruits.  The neck is supple with a full range of motion.   ENT: palate elevate symmetrically, no evidence of high arch palate, tongue midline erythema in posterior pharynx, Mallampati Classification Class III   CARDIOVASCULAR:  Regular rate and rhythm with normal S1 and S2 without rub or gallop.  RESPIRATORY:  Clear to auscultation without wheezes or crackle   ABDOMEN:  Soft and non-tender, positive bowel sound without hepatosplenomegaly  BACK:  Back is straight without midline defect.    PSYCH:  Higher cortical function/mental status:  The patient is alert.  She is oriented x3 to time, place and person.  Recent and the remote memory appear normal.  The patient has a good fund of knowledge.  There is no visual or auditory hallucination or suicidal or homicidal ideation.  SPEECH:There is no gross evidence of aphasia, dysarthria or agnosia.      CRANIAL NERVES:  Pupils are 4mm, equal round reactive to light, " reacting briskly to 2mm without afferent pupillary defect.  Visual fields are intact to confrontation testing.  Fundoscopic examination reveals sharp disk margins with normal vasculature.  No papilledema, hemorrhages or exudates.  Extraocular movements are full and smooth with normal pursuits and saccades.  No nystagmus noted.  The face is symmetric. palate elevate symmetrically, Tongue midline, positive gag reflex. The remainder of the cranial nerves are intact and symmetrical.    MOTOR: Strength is 5/5 throughout with normal tone and bulk with the following exceptions, 4/5 intrinsic muscles of the hands and feet.  No involuntary movements noted.    Deep Tendon Reflexes: are 2/4 and symmetrical in the upper extremities, 2/4 and symmetrical at the knees and 1/4 and symmetrical at the Achilles tendon.  Plantar responses were down-going bilaterally.    SENSATION:  Intact to pinprick, light touch, vibration and proprioception.  Coordination:  The patient normally performs finger-nose-finger, heel-to-knee-to-shin and rapid alternating movements in symmetrical fashion.    COORDINATION AND GAIT:  The patient walks with a narrow-based gait.  Patient is able to heel-toe and tandem walk forward and backwards without difficulty.  Romberg and monopedal  Romberg are negative.    MUSCULOSKELETAL: Range of motion normal, no clubbing, cyanosis, or edema.  No joint swelling.            Records Reviewed: I have personally reviewed his previous medical record.    J Luis was seen today for establish care and extremity pain.    Diagnoses and all orders for this visit:    Medial plantar neuropathy, left    Medial plantar neuropathy, right    Lipoma of right lower extremity    Lipoma of left lower extremity    Other orders  -     DULoxetine (CYMBALTA) 20 MG capsule; Take 1 capsule by mouth Daily.        Treatments:  1. I've counseled patient extensively today that his lipoma compressing on bilateral medial plantar nerve giving rise to  bilateral medial plantar neuropathy  2.  I recommend patient to have his lipoma to be removed  3.  I will start this patient on Cymbalta for pain  4.  I've encouraged patient should stay off his feet as much as possible from further traumatic injury to his bilateral medial plantar nerve.    This Document is signed by Avi Ventura MD, FAAN, FAASM July 19, 201710:34 PM

## 2017-07-20 ENCOUNTER — OFFICE VISIT (OUTPATIENT)
Dept: ORTHOPEDIC SURGERY | Facility: CLINIC | Age: 32
End: 2017-07-20

## 2017-07-20 VITALS — WEIGHT: 299 LBS | BODY MASS INDEX: 34.59 KG/M2 | HEIGHT: 78 IN | RESPIRATION RATE: 18 BRPM

## 2017-07-20 DIAGNOSIS — M72.2 PLANTAR FASCIAL FIBROMATOSIS OF BOTH FEET: Primary | ICD-10-CM

## 2017-07-20 PROCEDURE — 99024 POSTOP FOLLOW-UP VISIT: CPT | Performed by: PODIATRIST

## 2017-07-20 RX ORDER — UREA 40 %
CREAM (GRAM) TOPICAL
Qty: 28 G | Refills: 0 | Status: SHIPPED | OUTPATIENT
Start: 2017-07-20 | End: 2017-10-11 | Stop reason: SDUPTHER

## 2017-07-20 NOTE — PROGRESS NOTES
Subjective   Patient ID: J Luis Estrada is a 31 y.o. male STATUS POST:  Post-op and Pain of the Left Foot (05/10/17  Left foot soft tissue mass of a plantar soft tissue mass that measured 7 x 3 cm from deep within the plantar foot, encompassing the musculature and plantar fascia.)  Returns for a follow-up on his left foot.  He hasn't been seen in quite some time now on his missed multiple follow-ups.  He said he went out of town for some time.  He then tells me that he stepped on something and didn't want to come back and see me until it Better.  Comes in today weightbearing in his boot to the left foot.    History of Present Illness    Review of Systems   Constitutional: Negative for fever.   HENT: Negative for voice change.    Eyes: Negative for visual disturbance.   Respiratory: Negative for shortness of breath.    Cardiovascular: Negative for chest pain.   Gastrointestinal: Negative for abdominal distention and abdominal pain.   Genitourinary: Negative for dysuria.   Musculoskeletal: Positive for arthralgias. Negative for gait problem and joint swelling.   Skin: Negative for rash.   Neurological: Negative for speech difficulty.   Hematological: Does not bruise/bleed easily.   Psychiatric/Behavioral: Negative for confusion.       Past Medical History:   Diagnosis Date   • Anxiety    • Bipolar affective disorder, currently active     ON MEDICATION   • DVT (deep venous thrombosis)     RIGHT LEG -2012   • Hypertension     PT DENIES ANY HTN.  BUT STATES IT DOES GO UP AT TIMES.   • Migraine    • Neuropathy    • Seizure     LAST ONE WAS 4/2017.  STATES NOT ON ANY MEDICATION FOR.     • Uses contact lenses         Past Surgical History:   Procedure Laterality Date   • ADENOIDECTOMY     • EXCISION MASS LEG Left 5/10/2017    Procedure: Left foot soft tissue mass/plantar fibroma excision;  Surgeon: Lucius Olguin DPM;  Location: [facility] OR;  Service:    • TONSILLECTOMY AND ADENOIDECTOMY     • WISDOM TOOTH  EXTRACTION         Allergies   Allergen Reactions   • Penicillins        Objective   Physical Exam   Constitutional: He is oriented to person, place, and time. He appears well-developed and well-nourished.   HENT:   Head: Normocephalic.   Eyes: Pupils are equal, round, and reactive to light.   Neurological: He is alert and oriented to person, place, and time.   Skin: Skin is warm.   Psychiatric: He has a normal mood and affect. His behavior is normal.   Vitals reviewed.    Ortho Exam  Ortho Exam he's neurovascularly intact to the left foot.  Incisions are clean dry and intact, well coapted, no sign of dehiscence  no sign of infection is present  Plantar incisions healed well but has somewhat thickened and scabbed areas that are removed and it's uneventful.    Assessment/Plan  status post 2.5 months from left foot soft tissue mass excision  Independent Review of Radiographic Studies:      Laboratory and Other Studies:     Medical Decision Making:        Procedures  [] No procedures were performed in office today.    There are no diagnoses linked to this encounter.      Recommendations/Plan:  He does not have to wear the boot anymore.  He can wear regular shoe.  I will prescribe him urea to put over the callused areas and recommend he do this once or twice a day to help soften them.  He can follow-up when he is ready to remove the lesion from the other side.    No Follow-up on file.  Patient agreeable to call or return sooner for any concerns.

## 2017-10-12 RX ORDER — UREA 40 %
CREAM (GRAM) TOPICAL
Qty: 28 G | Refills: 0 | Status: SHIPPED | OUTPATIENT
Start: 2017-10-12 | End: 2019-03-13

## 2018-04-08 ENCOUNTER — HOSPITAL ENCOUNTER (EMERGENCY)
Facility: HOSPITAL | Age: 33
Discharge: HOME | End: 2018-04-08
Attending: EMERGENCY MEDICINE | Admitting: EMERGENCY MEDICINE

## 2018-04-08 ENCOUNTER — APPOINTMENT (OUTPATIENT)
Dept: CT IMAGING | Facility: HOSPITAL | Age: 33
End: 2018-04-08

## 2018-04-08 VITALS
SYSTOLIC BLOOD PRESSURE: 137 MMHG | WEIGHT: 295 LBS | OXYGEN SATURATION: 97 % | DIASTOLIC BLOOD PRESSURE: 99 MMHG | RESPIRATION RATE: 20 BRPM | TEMPERATURE: 98.2 F | HEIGHT: 78 IN | HEART RATE: 77 BPM | BODY MASS INDEX: 34.13 KG/M2

## 2018-04-08 DIAGNOSIS — R10.9 FLANK PAIN: Primary | ICD-10-CM

## 2018-04-08 LAB
ANION GAP SERPL CALCULATED.3IONS-SCNC: 15.8 MMOL/L (ref 10–20)
BASOPHILS # BLD AUTO: 0.04 10*3/MM3 (ref 0–0.2)
BASOPHILS NFR BLD AUTO: 0.6 % (ref 0–2.5)
BILIRUB UR QL STRIP: NEGATIVE
BUN BLD-MCNC: 11 MG/DL (ref 7–20)
BUN/CREAT SERPL: 13.8 (ref 6.3–21.9)
CALCIUM SPEC-SCNC: 9.3 MG/DL (ref 8.4–10.2)
CHLORIDE SERPL-SCNC: 104 MMOL/L (ref 98–107)
CLARITY UR: CLEAR
CO2 SERPL-SCNC: 26 MMOL/L (ref 26–30)
COLOR UR: YELLOW
CREAT BLD-MCNC: 0.8 MG/DL (ref 0.6–1.3)
DEPRECATED RDW RBC AUTO: 49 FL (ref 37–54)
EOSINOPHIL # BLD AUTO: 0.28 10*3/MM3 (ref 0–0.7)
EOSINOPHIL NFR BLD AUTO: 4 % (ref 0–7)
ERYTHROCYTE [DISTWIDTH] IN BLOOD BY AUTOMATED COUNT: 14 % (ref 11.5–14.5)
GFR SERPL CREATININE-BSD FRML MDRD: 111 ML/MIN/1.73
GLUCOSE BLD-MCNC: 93 MG/DL (ref 74–98)
GLUCOSE UR STRIP-MCNC: NEGATIVE MG/DL
HCT VFR BLD AUTO: 45.9 % (ref 42–52)
HGB BLD-MCNC: 14.8 G/DL (ref 14–18)
HGB UR QL STRIP.AUTO: NEGATIVE
IMM GRANULOCYTES # BLD: 0.02 10*3/MM3 (ref 0–0.06)
IMM GRANULOCYTES NFR BLD: 0.3 % (ref 0–0.6)
KETONES UR QL STRIP: NEGATIVE
LEUKOCYTE ESTERASE UR QL STRIP.AUTO: NEGATIVE
LYMPHOCYTES # BLD AUTO: 2.77 10*3/MM3 (ref 0.6–3.4)
LYMPHOCYTES NFR BLD AUTO: 39.1 % (ref 10–50)
MCH RBC QN AUTO: 30.6 PG (ref 27–31)
MCHC RBC AUTO-ENTMCNC: 32.2 G/DL (ref 30–37)
MCV RBC AUTO: 94.8 FL (ref 80–94)
MONOCYTES # BLD AUTO: 0.71 10*3/MM3 (ref 0–0.9)
MONOCYTES NFR BLD AUTO: 10 % (ref 0–12)
NEUTROPHILS # BLD AUTO: 3.26 10*3/MM3 (ref 2–6.9)
NEUTROPHILS NFR BLD AUTO: 46 % (ref 37–80)
NITRITE UR QL STRIP: NEGATIVE
NRBC BLD MANUAL-RTO: 0 /100 WBC (ref 0–0)
PH UR STRIP.AUTO: 7 [PH] (ref 5–8)
PLATELET # BLD AUTO: 167 10*3/MM3 (ref 130–400)
PMV BLD AUTO: 10.3 FL (ref 6–12)
POTASSIUM BLD-SCNC: 4.8 MMOL/L (ref 3.5–5.1)
PROT UR QL STRIP: NEGATIVE
RBC # BLD AUTO: 4.84 10*6/MM3 (ref 4.7–6.1)
SODIUM BLD-SCNC: 141 MMOL/L (ref 137–145)
SP GR UR STRIP: 1.01 (ref 1–1.03)
UROBILINOGEN UR QL STRIP: NORMAL
WBC NRBC COR # BLD: 7.08 10*3/MM3 (ref 4.8–10.8)

## 2018-04-08 PROCEDURE — 85025 COMPLETE CBC W/AUTO DIFF WBC: CPT | Performed by: EMERGENCY MEDICINE

## 2018-04-08 PROCEDURE — 80048 BASIC METABOLIC PNL TOTAL CA: CPT | Performed by: EMERGENCY MEDICINE

## 2018-04-08 PROCEDURE — 96375 TX/PRO/DX INJ NEW DRUG ADDON: CPT

## 2018-04-08 PROCEDURE — 96374 THER/PROPH/DIAG INJ IV PUSH: CPT

## 2018-04-08 PROCEDURE — 99283 EMERGENCY DEPT VISIT LOW MDM: CPT

## 2018-04-08 PROCEDURE — 74176 CT ABD & PELVIS W/O CONTRAST: CPT

## 2018-04-08 PROCEDURE — 81003 URINALYSIS AUTO W/O SCOPE: CPT | Performed by: EMERGENCY MEDICINE

## 2018-04-08 PROCEDURE — 25010000002 KETOROLAC TROMETHAMINE PER 15 MG: Performed by: EMERGENCY MEDICINE

## 2018-04-08 PROCEDURE — 25010000002 ONDANSETRON PER 1 MG: Performed by: EMERGENCY MEDICINE

## 2018-04-08 RX ORDER — ONDANSETRON 2 MG/ML
4 INJECTION INTRAMUSCULAR; INTRAVENOUS ONCE
Status: COMPLETED | OUTPATIENT
Start: 2018-04-08 | End: 2018-04-08

## 2018-04-08 RX ORDER — KETOROLAC TROMETHAMINE 30 MG/ML
15 INJECTION, SOLUTION INTRAMUSCULAR; INTRAVENOUS ONCE
Status: COMPLETED | OUTPATIENT
Start: 2018-04-08 | End: 2018-04-08

## 2018-04-08 RX ORDER — PHENAZOPYRIDINE HYDROCHLORIDE 95 MG/1
95 TABLET ORAL 3 TIMES DAILY PRN
Qty: 10 TABLET | Refills: 0 | Status: SHIPPED | OUTPATIENT
Start: 2018-04-08 | End: 2019-03-13

## 2018-04-08 RX ADMIN — KETOROLAC TROMETHAMINE 15 MG: 30 INJECTION, SOLUTION INTRAMUSCULAR at 13:15

## 2018-04-08 RX ADMIN — ONDANSETRON 4 MG: 2 INJECTION INTRAMUSCULAR; INTRAVENOUS at 13:14

## 2018-04-08 NOTE — ED PROVIDER NOTES
Subjective   History of Present Illness   32M w/ hx of bipolar, prior DVT p/w several days of worsening L flank pain (sharp, intermittent) w/ associated pressure when urinating and nausea and not relieved by Azo. Denies other specific symptoms, prior renal stones. Defers any pain control at this time.     Review of Systems   Gastrointestinal: Positive for nausea.   Genitourinary: Positive for flank pain.   All other systems reviewed and are negative.      Past Medical History:   Diagnosis Date   • Anxiety    • Bipolar affective disorder, currently active     ON MEDICATION   • DVT (deep venous thrombosis)     RIGHT LEG -2012   • Hypertension     PT DENIES ANY HTN.  BUT STATES IT DOES GO UP AT TIMES.   • Migraine    • Neuropathy    • Seizure     LAST ONE WAS 4/2017.  STATES NOT ON ANY MEDICATION FOR.     • Uses contact lenses        Allergies   Allergen Reactions   • Penicillins        Past Surgical History:   Procedure Laterality Date   • ADENOIDECTOMY     • EXCISION MASS LEG Left 5/10/2017    Procedure: Left foot soft tissue mass/plantar fibroma excision;  Surgeon: Lucius Olguin DPM;  Location: [facility] OR;  Service:    • TONSILLECTOMY AND ADENOIDECTOMY     • WISDOM TOOTH EXTRACTION         Family History   Problem Relation Age of Onset   • Gout Other    • Osteoarthritis Other    • Rheum arthritis Other    • Diabetes Other        Social History     Social History   • Marital status:      Social History Main Topics   • Smoking status: Current Every Day Smoker     Packs/day: 1.00     Years: 14.00     Types: Cigarettes   • Smokeless tobacco: Never Used   • Alcohol use Yes      Comment: rarely   • Drug use: No      Comment: previously.  2013 WAS LAST USE.     • Sexual activity: Defer     Other Topics Concern   • Not on file           Objective   Physical Exam   Constitutional: He is oriented to person, place, and time. He appears well-developed and well-nourished. No distress.   HENT:   Head: Normocephalic.    Mouth/Throat: Oropharynx is clear and moist.   Eyes: No scleral icterus.   Neck: Normal range of motion. Neck supple. No tracheal deviation present.   Cardiovascular: Normal rate, regular rhythm, normal heart sounds and intact distal pulses.  Exam reveals no gallop and no friction rub.    No murmur heard.  Pulmonary/Chest: Effort normal and breath sounds normal. No stridor. No respiratory distress. He has no wheezes. He has no rales. He exhibits no tenderness.   Abdominal: Soft. He exhibits no distension and no mass. There is no tenderness. There is no rebound and no guarding.   Genitourinary:   Genitourinary Comments: L flank CVA ttp   Musculoskeletal: Normal range of motion. He exhibits no deformity.   Neurological: He is alert and oriented to person, place, and time.   Skin: Skin is warm and dry. No rash noted. He is not diaphoretic. No erythema. No pallor.   Psychiatric: He has a normal mood and affect. His behavior is normal.   Nursing note and vitals reviewed.      Procedures         ED Course  ED Course                  MDM   32-year-old male here with left flank pain and pressure when he urinates.  Afebrile, vital signs stable.  Tender to palpation over his left costovertebral angle. Considered pyelonephritis, renal stone, cholecystitis, AAA, msk back pain and the plan is to send labs, CT abd/pelvis and reassess.    2:55 PM labs are unremarkable.  No evidence of UTI.  CT scan shows bilateral nonobstructing stones in the kidneys which likely not causing him any symptoms.  Overall, reassuring workup.  Suspect he may have muscle skeletal positive pain versus bladder spasm.  Will recommend he continue the AZO, and Tylenol and ibuprofen, and follow-up with primary care physician for further care    Final diagnoses:   Flank pain            Srikanth Kaye MD  04/08/18 2853

## 2018-06-17 ENCOUNTER — APPOINTMENT (OUTPATIENT)
Dept: GENERAL RADIOLOGY | Facility: HOSPITAL | Age: 33
End: 2018-06-17

## 2018-06-17 ENCOUNTER — HOSPITAL ENCOUNTER (EMERGENCY)
Facility: HOSPITAL | Age: 33
Discharge: HOME | End: 2018-06-17
Attending: STUDENT IN AN ORGANIZED HEALTH CARE EDUCATION/TRAINING PROGRAM | Admitting: STUDENT IN AN ORGANIZED HEALTH CARE EDUCATION/TRAINING PROGRAM

## 2018-06-17 VITALS
HEIGHT: 78 IN | OXYGEN SATURATION: 94 % | WEIGHT: 296.4 LBS | DIASTOLIC BLOOD PRESSURE: 72 MMHG | SYSTOLIC BLOOD PRESSURE: 127 MMHG | RESPIRATION RATE: 16 BRPM | TEMPERATURE: 98.1 F | HEART RATE: 90 BPM | BODY MASS INDEX: 34.29 KG/M2

## 2018-06-17 DIAGNOSIS — R05.9 COUGH IN ADULT PATIENT: ICD-10-CM

## 2018-06-17 DIAGNOSIS — R06.2 WHEEZING: Primary | ICD-10-CM

## 2018-06-17 PROCEDURE — 94640 AIRWAY INHALATION TREATMENT: CPT

## 2018-06-17 PROCEDURE — 71046 X-RAY EXAM CHEST 2 VIEWS: CPT

## 2018-06-17 PROCEDURE — 63710000001 PREDNISONE PER 5 MG: Performed by: PHYSICIAN ASSISTANT

## 2018-06-17 PROCEDURE — 94799 UNLISTED PULMONARY SVC/PX: CPT

## 2018-06-17 PROCEDURE — 99283 EMERGENCY DEPT VISIT LOW MDM: CPT

## 2018-06-17 RX ORDER — ALBUTEROL SULFATE 2.5 MG/3ML
2.5 SOLUTION RESPIRATORY (INHALATION) EVERY 4 HOURS PRN
Qty: 40 VIAL | Refills: 0 | Status: SHIPPED | OUTPATIENT
Start: 2018-06-17

## 2018-06-17 RX ORDER — PREDNISONE 20 MG/1
20 TABLET ORAL
Qty: 15 TABLET | Refills: 0 | Status: SHIPPED | OUTPATIENT
Start: 2018-06-17 | End: 2018-08-13

## 2018-06-17 RX ORDER — ACETAMINOPHEN 325 MG/1
650 TABLET ORAL ONCE
Status: COMPLETED | OUTPATIENT
Start: 2018-06-17 | End: 2018-06-17

## 2018-06-17 RX ORDER — ALBUTEROL SULFATE 2.5 MG/3ML
2.5 SOLUTION RESPIRATORY (INHALATION) ONCE
Status: DISCONTINUED | OUTPATIENT
Start: 2018-06-17 | End: 2018-06-17

## 2018-06-17 RX ORDER — GUAIFENESIN 200 MG/1
400 TABLET ORAL EVERY 6 HOURS PRN
Qty: 20 TABLET | Refills: 0 | Status: SHIPPED | OUTPATIENT
Start: 2018-06-17 | End: 2019-03-13

## 2018-06-17 RX ORDER — ALBUTEROL SULFATE 2.5 MG/3ML
2.5 SOLUTION RESPIRATORY (INHALATION)
Status: COMPLETED | OUTPATIENT
Start: 2018-06-17 | End: 2018-06-17

## 2018-06-17 RX ORDER — IPRATROPIUM BROMIDE AND ALBUTEROL SULFATE 2.5; .5 MG/3ML; MG/3ML
3 SOLUTION RESPIRATORY (INHALATION) ONCE
Status: COMPLETED | OUTPATIENT
Start: 2018-06-17 | End: 2018-06-17

## 2018-06-17 RX ADMIN — ALBUTEROL SULFATE 2.5 MG: 2.5 SOLUTION RESPIRATORY (INHALATION) at 14:40

## 2018-06-17 RX ADMIN — IPRATROPIUM BROMIDE AND ALBUTEROL SULFATE 3 ML: .5; 3 SOLUTION RESPIRATORY (INHALATION) at 13:06

## 2018-06-17 RX ADMIN — ACETAMINOPHEN 650 MG: 325 TABLET, FILM COATED ORAL at 15:05

## 2018-06-17 RX ADMIN — ALBUTEROL SULFATE 2.5 MG: 2.5 SOLUTION RESPIRATORY (INHALATION) at 14:39

## 2018-06-17 RX ADMIN — PREDNISONE 60 MG: 50 TABLET ORAL at 13:36

## 2018-06-17 RX ADMIN — ALBUTEROL SULFATE 2.5 MG: 2.5 SOLUTION RESPIRATORY (INHALATION) at 14:37

## 2018-06-17 NOTE — DISCHARGE INSTRUCTIONS
Use your nebulizer machine every 4 hours as needed.  Finish  Bactrim as directed.  Call your primary care physician in the morning and arrange a follow-up appointment.  Return to the ER over the weekend for any chest pain or difficulty breathing.

## 2018-06-17 NOTE — ED PROVIDER NOTES
Subjective   32-year-old male complaining of a one-week history of nonproductive cough with wheezing, sweats, chills and shortness of air.  The patient states he does have a history of pneumonia with hospitalization about 3-4 years ago.  Former smoker but also lives with a smoker.  He denies any abdominal pain, nausea or vomiting.  He saw his primary care physician, Dr. Mo on the 14th and was given a prescription of Bactrim and cough syrup.  He has been taking this without relief.        History provided by:  Patient  History limited by: no limits.   used: No        Review of Systems   Constitutional: Negative.  Negative for appetite change, chills, diaphoresis and fever.   HENT: Negative.  Negative for congestion, ear pain and sore throat.    Eyes: Negative.    Respiratory: Positive for cough, shortness of breath and wheezing. Negative for chest tightness.    Cardiovascular: Negative.  Negative for chest pain.   Gastrointestinal: Negative.  Negative for abdominal pain, blood in stool, diarrhea, nausea and vomiting.   Endocrine: Negative.    Genitourinary: Negative.  Negative for difficulty urinating.   Musculoskeletal: Negative.  Negative for back pain and neck pain.   Skin: Negative.  Negative for rash.   Allergic/Immunologic: Negative.    Neurological: Negative.  Negative for headaches.   Hematological: Negative.    Psychiatric/Behavioral: Negative.    All other systems reviewed and are negative.      Past Medical History:   Diagnosis Date   • Anxiety    • Bipolar affective disorder, currently active     ON MEDICATION   • DVT (deep venous thrombosis)     RIGHT LEG -2012   • Hypertension     PT DENIES ANY HTN.  BUT STATES IT DOES GO UP AT TIMES.   • Migraine    • Neuropathy    • Seizure     LAST ONE WAS 4/2017.  STATES NOT ON ANY MEDICATION FOR.     • Uses contact lenses        Allergies   Allergen Reactions   • Penicillins        Past Surgical History:   Procedure Laterality Date   •  ADENOIDECTOMY     • EXCISION MASS LEG Left 5/10/2017    Procedure: Left foot soft tissue mass/plantar fibroma excision;  Surgeon: Lucius Olguin DPM;  Location: [facility];  Service:    • TONSILLECTOMY AND ADENOIDECTOMY     • WISDOM TOOTH EXTRACTION         Family History   Problem Relation Age of Onset   • Gout Other    • Osteoarthritis Other    • Rheum arthritis Other    • Diabetes Other        Social History     Social History   • Marital status:      Social History Main Topics   • Smoking status: Current Every Day Smoker     Packs/day: 1.00     Years: 14.00     Types: Cigarettes   • Smokeless tobacco: Never Used   • Alcohol use Yes      Comment: rarely   • Drug use: No      Comment: previously.  2013 WAS LAST USE.     • Sexual activity: Defer     Other Topics Concern   • Not on file           Objective   Physical Exam   Constitutional: He is oriented to person, place, and time. He appears well-developed and well-nourished. No distress.   HENT:   Head: Normocephalic and atraumatic.   Right Ear: External ear normal.   Left Ear: External ear normal.   Nose: Nose normal.   Mouth/Throat: Oropharynx is clear and moist. No oropharyngeal exudate.   Eyes: Conjunctivae and EOM are normal. Pupils are equal, round, and reactive to light. Right eye exhibits no discharge. Left eye exhibits no discharge. No scleral icterus.   Neck: Normal range of motion. Neck supple. No JVD present. No tracheal deviation present.   Cardiovascular: Normal rate, regular rhythm and normal heart sounds.    Pulmonary/Chest: Effort normal. No stridor. No respiratory distress. He has wheezes. He has no rales.   Able to talk in full sentences.   Abdominal: Soft. Bowel sounds are normal. He exhibits no mass. There is no tenderness. There is no rebound and no guarding. No hernia.   Musculoskeletal: Normal range of motion. He exhibits no edema, tenderness or deformity.   Lymphadenopathy:     He has no cervical adenopathy.   Neurological: He is  alert and oriented to person, place, and time. No cranial nerve deficit. He exhibits normal muscle tone. Coordination normal.   Skin: Skin is warm and dry. No rash noted. He is not diaphoretic. No erythema. No pallor.   Psychiatric: He has a normal mood and affect. His behavior is normal. Judgment and thought content normal.   Nursing note and vitals reviewed.      Procedures           ED Course  ED Course as of Jun 17 1550   Sun Jun 17, 2018   1412 Two-view chest x-ray as interpreted by me, no active disease.  [LB]   1413 Lungs still wheezing, no improvement.  [LB]   1529 Lung sounds improved, pt states is comfortable to be discharged home, will give rx for nebulizer machine and prednisone,  is to finish Bactrim as directed.  Recheck with Dr. Logan tomorrow.  [LB]      ED Course User Index  [LB] Carli Jones PA-C                  MDM  Number of Diagnoses or Management Options  Cough in adult patient: new and requires workup  Wheezing: new and requires workup     Amount and/or Complexity of Data Reviewed  Independent visualization of images, tracings, or specimens: yes    Risk of Complications, Morbidity, and/or Mortality  Presenting problems: moderate  Management options: moderate    Patient Progress  Patient progress: improved        Final diagnoses:   Cough in adult patient   Wheezing            Carli Jones PA-C  06/17/18 1550

## 2018-12-07 ENCOUNTER — HOSPITAL ENCOUNTER (EMERGENCY)
Facility: HOSPITAL | Age: 33
Discharge: HOME | End: 2018-12-07
Attending: EMERGENCY MEDICINE | Admitting: EMERGENCY MEDICINE

## 2018-12-07 ENCOUNTER — APPOINTMENT (OUTPATIENT)
Dept: ULTRASOUND IMAGING | Facility: HOSPITAL | Age: 33
End: 2018-12-07

## 2018-12-07 VITALS
DIASTOLIC BLOOD PRESSURE: 87 MMHG | TEMPERATURE: 97.8 F | HEART RATE: 78 BPM | RESPIRATION RATE: 18 BRPM | HEIGHT: 78 IN | OXYGEN SATURATION: 99 % | BODY MASS INDEX: 35.91 KG/M2 | WEIGHT: 310.4 LBS | SYSTOLIC BLOOD PRESSURE: 155 MMHG

## 2018-12-07 DIAGNOSIS — M79.604 PAIN OF RIGHT LOWER EXTREMITY: Primary | ICD-10-CM

## 2018-12-07 PROCEDURE — 93971 EXTREMITY STUDY: CPT

## 2018-12-07 PROCEDURE — 99283 EMERGENCY DEPT VISIT LOW MDM: CPT

## 2018-12-07 NOTE — ED PROVIDER NOTES
TRIAGE CHIEF COMPLAINT:     Nursing and triage notes reviewed    Chief Complaint   Patient presents with   • Leg Swelling      HPI: J Luis Estrada is a 33 y.o. male who presents to the emergency department complaining of pain and swelling in his right calf.  Patient states symptoms have been present for the past 2 days.  He states the pain is worse today.  Patient does have a history of a DVT in the same leg.  Patient is no longer on that tenderness.  Denies any chest pain or shortness of breath.  No recent travel.  No surgery.  No prolonged periods of immobilization.    REVIEW OF SYSTEMS: All other systems reviewed and are negative     PAST MEDICAL HISTORY:   Past Medical History:   Diagnosis Date   • Anxiety    • Bipolar affective disorder, currently active (CMS/[facility])     ON MEDICATION   • DVT (deep venous thrombosis) (CMS/[facility])     RIGHT LEG -2012   • Hypertension     PT DENIES ANY HTN.  BUT STATES IT DOES GO UP AT TIMES.   • Migraine    • Neuropathy    • Seizure (CMS/[facility])     LAST ONE WAS 4/2017.  STATES NOT ON ANY MEDICATION FOR.     • Uses contact lenses         FAMILY HISTORY:   Family History   Problem Relation Age of Onset   • Gout Other    • Osteoarthritis Other    • Rheum arthritis Other    • Diabetes Other         SOCIAL HISTORY:   Social History     Socioeconomic History   • Marital status:      Spouse name: Not on file   • Number of children: Not on file   • Years of education: Not on file   • Highest education level: Not on file   Social Needs   • Financial resource strain: Not on file   • Food insecurity - worry: Not on file   • Food insecurity - inability: Not on file   • Transportation needs - medical: Not on file   • Transportation needs - non-medical: Not on file   Occupational History   • Not on file   Tobacco Use   • Smoking status: Current Every Day Smoker     Packs/day: 1.00     Years: 14.00     Pack years: 14.00     Types: Cigarettes   • Smokeless tobacco: Never Used    Substance and Sexual Activity   • Alcohol use: Yes     Comment: rarely   • Drug use: No     Comment: previously.  2013 WAS LAST USE.     • Sexual activity: Defer   Other Topics Concern   • Not on file   Social History Narrative   • Not on file        SURGICAL HISTORY:   Past Surgical History:   Procedure Laterality Date   • ADENOIDECTOMY     • TONSILLECTOMY AND ADENOIDECTOMY     • WISDOM TOOTH EXTRACTION          CURRENT MEDICATIONS:      Medication List      ASK your doctor about these medications    albuterol (2.5 MG/3ML) 0.083% nebulizer solution  Commonly known as:  PROVENTIL  Take 2.5 mg by nebulization Every 4 (Four) Hours As Needed for Wheezing.     guaiFENesin 200 MG tablet  Take 2 tablets by mouth Every 6 (Six) Hours As Needed for Cough.     hydrOXYzine 25 MG tablet  Commonly known as:  ATARAX  Take 1 tablet by mouth At Night As Needed for Itching.     ibuprofen 800 MG tablet  Commonly known as:  ADVIL,MOTRIN  Take 1 tablet by mouth Every 8 (Eight) Hours As Needed (pain).     lamoTRIgine 150 MG tablet  Commonly known as:  LaMICtal     loperamide 2 MG capsule  Commonly known as:  IMODIUM  Take 1 capsule by mouth 4 (Four) Times a Day As Needed for Diarrhea.     LORazepam 0.5 MG tablet  Commonly known as:  ATIVAN     MethylPREDNISolone 4 MG tablet  Commonly known as:  MEDROL (ALISTAIR)  Take as directed on package instructions.     phenazopyridine 95 MG tablet  Commonly known as:  AZO-TABS  Take 1 tablet by mouth 3 (Three) Times a Day As Needed for bladder spasms.     predniSONE 20 MG tablet  Commonly known as:  DELTASONE  Take 1 tablet by mouth 3 (Three) Times a Day. For first week, take 3 tabs   daily.  then take 2 tabs, then 1 tab     promethazine 25 MG tablet  Commonly known as:  PHENERGAN  Take 1 tablet by mouth Every 4 (Four) Hours As Needed for Nausea or   Vomiting.     SUBOXONE 8-2 MG film film  Generic drug:  buprenorphine-naloxone     triamcinolone 0.1 % ointment  Commonly known as:  KENALOG  Apply   topically 2 (Two) Times a Day.     urea 40 % cream  Commonly known as:  CARMOL  Apply  topically Daily.           ALLERGIES: Penicillins     PHYSICAL EXAM:   VITAL SIGNS:   Vitals:    12/07/18 1521   BP: 153/88   Pulse: 79   Resp: 18   Temp: 97.8 °F (36.6 °C)   SpO2: 98%      CONSTITUTIONAL: Awake, oriented, appears non-toxic   HENT: Atraumatic, normocephalic, oral mucosa pink and moist, airway patent.    EYES: Conjunctiva clear   NECK: Trachea midline   CARDIOVASCULAR: Normal heart rate, Normal rhythm, No murmurs, rubs, gallops   PULMONARY/CHEST: Clear to auscultation, no rhonchi, wheezes, or rales. Symmetrical breath sounds.  ABDOMINAL: Non-distended, soft, non-tender - no rebound or guarding   NEUROLOGIC: Non-focal, moving all four extremities, no gross sensory or motor deficits.   EXTREMITIES: No clubbing, cyanosis.  Is mild swelling of the right calf.  There is some slight erythema and tenderness to palpation.  No obvious warmth.  SKIN: Warm, Dry, No erythema, No rash     ED COURSE / MEDICAL DECISION MAKING:   J Luis Estrada is a 33 y.o. male who presents to the emergency department for evaluation of pain and swelling of the right lower extremity.  Patient does have some mild swelling and redness in this area.  It is slightly tender to palpation.  We will obtain an ultrasound for further evaluation given patient's history.  Ultrasound is negative.  We'll have patient follow up for repeat ultrasound if he continues to have pain.  Leg does not appear infected.  Return precautions discussed.    DECISION TO DISCHARGE/ADMIT: see ED care timeline     FINAL IMPRESSION:   1 -- leg pain   2 --   3 --     Electronically signed by: Annabelle Chiu MD, 12/7/2018 6:49 PM       Annabelle Chiu MD  12/07/18 7542

## 2020-03-12 ENCOUNTER — TRANSCRIBE ORDERS (OUTPATIENT)
Dept: ADMINISTRATIVE | Facility: HOSPITAL | Age: 35
End: 2020-03-12

## 2020-03-12 DIAGNOSIS — S93.324A LISFRANC DISLOCATION, RIGHT, INITIAL ENCOUNTER: Primary | ICD-10-CM

## 2020-03-17 ENCOUNTER — APPOINTMENT (OUTPATIENT)
Dept: CT IMAGING | Facility: HOSPITAL | Age: 35
End: 2020-03-17

## 2024-08-01 ENCOUNTER — READMISSION MANAGEMENT (OUTPATIENT)
Dept: CALL CENTER | Facility: HOSPITAL | Age: 39
End: 2024-08-01
Payer: OTHER MISCELLANEOUS

## 2024-08-02 NOTE — OUTREACH NOTE
Prep Survey      Flowsheet Row Responses   [facility] facility patient discharged from? Non-BH   Is LACE score < 7 ? Non-BH Discharge   Eligibility Not Eligible   What are the reasons patient is not eligible? Other  [no office hours]   Does the patient have one of the following disease processes/diagnoses(primary or secondary)? Other   Prep survey completed? Yes            LUIS ALBERTO BEAVER - Registered Nurse

## 2024-08-06 PROBLEM — M86.172 ACUTE OSTEOMYELITIS OF LEFT ANKLE OR FOOT: Status: ACTIVE | Noted: 2024-08-06

## 2024-08-06 RX ORDER — SODIUM CHLORIDE 0.9 % (FLUSH) 0.9 %
10 SYRINGE (ML) INJECTION AS NEEDED
Status: CANCELLED | OUTPATIENT
Start: 2024-08-06

## 2024-08-07 ENCOUNTER — HOSPITAL ENCOUNTER (OUTPATIENT)
Dept: INFUSION THERAPY | Facility: HOSPITAL | Age: 39
Discharge: HOME | End: 2024-08-07
Admitting: INTERNAL MEDICINE
Payer: COMMERCIAL

## 2024-08-07 VITALS
SYSTOLIC BLOOD PRESSURE: 142 MMHG | OXYGEN SATURATION: 97 % | WEIGHT: 303 LBS | HEIGHT: 78 IN | BODY MASS INDEX: 35.06 KG/M2 | HEART RATE: 78 BPM | TEMPERATURE: 96.9 F | DIASTOLIC BLOOD PRESSURE: 95 MMHG | RESPIRATION RATE: 18 BRPM

## 2024-08-07 DIAGNOSIS — M86.172 ACUTE OSTEOMYELITIS OF LEFT ANKLE OR FOOT: Primary | ICD-10-CM

## 2024-08-07 LAB
ALBUMIN SERPL-MCNC: 4.1 G/DL (ref 3.5–5.2)
ALP SERPL-CCNC: 95 U/L (ref 39–117)
ALT SERPL W P-5'-P-CCNC: 23 U/L (ref 1–41)
AST SERPL-CCNC: 20 U/L (ref 1–40)
BASOPHILS # BLD AUTO: 0.05 10*3/MM3 (ref 0–0.2)
BASOPHILS NFR BLD AUTO: 0.7 % (ref 0–1.5)
BILIRUB CONJ SERPL-MCNC: <0.2 MG/DL (ref 0–0.3)
BILIRUB INDIRECT SERPL-MCNC: NORMAL MG/DL
BILIRUB SERPL-MCNC: 0.2 MG/DL (ref 0–1.2)
BUN SERPL-MCNC: 16 MG/DL (ref 6–20)
CK SERPL-CCNC: 74 U/L (ref 20–200)
CREAT SERPL-MCNC: 0.77 MG/DL (ref 0.76–1.27)
CRP SERPL-MCNC: 1.35 MG/DL (ref 0–0.5)
DEPRECATED RDW RBC AUTO: 42.4 FL (ref 37–54)
EGFRCR SERPLBLD CKD-EPI 2021: 116.1 ML/MIN/1.73
EOSINOPHIL # BLD AUTO: 0.36 10*3/MM3 (ref 0–0.4)
EOSINOPHIL NFR BLD AUTO: 4.9 % (ref 0.3–6.2)
ERYTHROCYTE [DISTWIDTH] IN BLOOD BY AUTOMATED COUNT: 12.8 % (ref 12.3–15.4)
HCT VFR BLD AUTO: 47.4 % (ref 37.5–51)
HGB BLD-MCNC: 15.4 G/DL (ref 13–17.7)
IMM GRANULOCYTES # BLD AUTO: 0.04 10*3/MM3 (ref 0–0.05)
IMM GRANULOCYTES NFR BLD AUTO: 0.5 % (ref 0–0.5)
LYMPHOCYTES # BLD AUTO: 1.66 10*3/MM3 (ref 0.7–3.1)
LYMPHOCYTES NFR BLD AUTO: 22.6 % (ref 19.6–45.3)
MCH RBC QN AUTO: 29 PG (ref 26.6–33)
MCHC RBC AUTO-ENTMCNC: 32.5 G/DL (ref 31.5–35.7)
MCV RBC AUTO: 89.3 FL (ref 79–97)
MONOCYTES # BLD AUTO: 0.55 10*3/MM3 (ref 0.1–0.9)
MONOCYTES NFR BLD AUTO: 7.5 % (ref 5–12)
NEUTROPHILS NFR BLD AUTO: 4.7 10*3/MM3 (ref 1.7–7)
NEUTROPHILS NFR BLD AUTO: 63.8 % (ref 42.7–76)
NRBC BLD AUTO-RTO: 0 /100 WBC (ref 0–0.2)
PLATELET # BLD AUTO: 205 10*3/MM3 (ref 140–450)
PMV BLD AUTO: 9.8 FL (ref 6–12)
PROT SERPL-MCNC: 8.2 G/DL (ref 6–8.5)
RBC # BLD AUTO: 5.31 10*6/MM3 (ref 4.14–5.8)
WBC NRBC COR # BLD AUTO: 7.36 10*3/MM3 (ref 3.4–10.8)

## 2024-08-07 PROCEDURE — 85025 COMPLETE CBC W/AUTO DIFF WBC: CPT | Performed by: INTERNAL MEDICINE

## 2024-08-07 PROCEDURE — 84520 ASSAY OF UREA NITROGEN: CPT | Performed by: INTERNAL MEDICINE

## 2024-08-07 PROCEDURE — 86140 C-REACTIVE PROTEIN: CPT | Performed by: INTERNAL MEDICINE

## 2024-08-07 PROCEDURE — 36592 COLLECT BLOOD FROM PICC: CPT

## 2024-08-07 PROCEDURE — 82550 ASSAY OF CK (CPK): CPT | Performed by: INTERNAL MEDICINE

## 2024-08-07 PROCEDURE — 36591 DRAW BLOOD OFF VENOUS DEVICE: CPT

## 2024-08-07 PROCEDURE — 80076 HEPATIC FUNCTION PANEL: CPT | Performed by: INTERNAL MEDICINE

## 2024-08-07 PROCEDURE — 82565 ASSAY OF CREATININE: CPT | Performed by: INTERNAL MEDICINE

## 2024-08-07 PROCEDURE — 96523 IRRIG DRUG DELIVERY DEVICE: CPT

## 2024-08-07 RX ORDER — SODIUM CHLORIDE 0.9 % (FLUSH) 0.9 %
10 SYRINGE (ML) INJECTION AS NEEDED
Status: DISCONTINUED | OUTPATIENT
Start: 2024-08-07 | End: 2024-08-09 | Stop reason: HOSPADM

## 2024-08-07 RX ORDER — DIPHENHYDRAMINE HCL 25 MG
25 CAPSULE ORAL EVERY 6 HOURS PRN
COMMUNITY

## 2024-08-07 RX ORDER — SODIUM CHLORIDE 0.9 % (FLUSH) 0.9 %
10 SYRINGE (ML) INJECTION AS NEEDED
OUTPATIENT
Start: 2024-08-14

## 2024-08-07 NOTE — CODE DOCUMENTATION
1600)  PICC  line dressing change completed; labs were drawn and taken to inpatient lab for processing; once labs released, will be faxing results to Dr. Alamo per request.  Instructed patient to call MD office in the morning to report nausea he is experiencing and to report he used last dose of antibiotics tonight, August 8th, and will need new delivery of medications.  Patient verbalized understanding.

## 2024-08-14 ENCOUNTER — HOSPITAL ENCOUNTER (OUTPATIENT)
Dept: INFUSION THERAPY | Facility: HOSPITAL | Age: 39
Discharge: HOME | End: 2024-08-14
Admitting: INTERNAL MEDICINE
Payer: COMMERCIAL

## 2024-08-14 DIAGNOSIS — M86.172 ACUTE OSTEOMYELITIS OF LEFT ANKLE OR FOOT: Primary | ICD-10-CM

## 2024-08-14 LAB
ALBUMIN SERPL-MCNC: 4.2 G/DL (ref 3.5–5.2)
ALP SERPL-CCNC: 95 U/L (ref 39–117)
ALT SERPL W P-5'-P-CCNC: 19 U/L (ref 1–41)
AST SERPL-CCNC: 18 U/L (ref 1–40)
BASOPHILS # BLD AUTO: 0.05 10*3/MM3 (ref 0–0.2)
BASOPHILS NFR BLD AUTO: 0.7 % (ref 0–1.5)
BILIRUB CONJ SERPL-MCNC: <0.2 MG/DL (ref 0–0.3)
BILIRUB INDIRECT SERPL-MCNC: NORMAL MG/DL
BILIRUB SERPL-MCNC: 0.2 MG/DL (ref 0–1.2)
BUN SERPL-MCNC: 17 MG/DL (ref 6–20)
CK SERPL-CCNC: 77 U/L (ref 20–200)
CREAT SERPL-MCNC: 0.91 MG/DL (ref 0.76–1.27)
CRP SERPL-MCNC: 1.06 MG/DL (ref 0–0.5)
DEPRECATED RDW RBC AUTO: 40.9 FL (ref 37–54)
EGFRCR SERPLBLD CKD-EPI 2021: 109.3 ML/MIN/1.73
EOSINOPHIL # BLD AUTO: 0.41 10*3/MM3 (ref 0–0.4)
EOSINOPHIL NFR BLD AUTO: 5.7 % (ref 0.3–6.2)
ERYTHROCYTE [DISTWIDTH] IN BLOOD BY AUTOMATED COUNT: 12.8 % (ref 12.3–15.4)
HCT VFR BLD AUTO: 45.5 % (ref 37.5–51)
HGB BLD-MCNC: 15.3 G/DL (ref 13–17.7)
IMM GRANULOCYTES # BLD AUTO: 0.04 10*3/MM3 (ref 0–0.05)
IMM GRANULOCYTES NFR BLD AUTO: 0.6 % (ref 0–0.5)
LYMPHOCYTES # BLD AUTO: 1.8 10*3/MM3 (ref 0.7–3.1)
LYMPHOCYTES NFR BLD AUTO: 25.1 % (ref 19.6–45.3)
MCH RBC QN AUTO: 29.4 PG (ref 26.6–33)
MCHC RBC AUTO-ENTMCNC: 33.6 G/DL (ref 31.5–35.7)
MCV RBC AUTO: 87.5 FL (ref 79–97)
MONOCYTES # BLD AUTO: 0.69 10*3/MM3 (ref 0.1–0.9)
MONOCYTES NFR BLD AUTO: 9.6 % (ref 5–12)
NEUTROPHILS NFR BLD AUTO: 4.19 10*3/MM3 (ref 1.7–7)
NEUTROPHILS NFR BLD AUTO: 58.3 % (ref 42.7–76)
NRBC BLD AUTO-RTO: 0 /100 WBC (ref 0–0.2)
PLATELET # BLD AUTO: 227 10*3/MM3 (ref 140–450)
PMV BLD AUTO: 9.9 FL (ref 6–12)
PROT SERPL-MCNC: 8.2 G/DL (ref 6–8.5)
RBC # BLD AUTO: 5.2 10*6/MM3 (ref 4.14–5.8)
WBC NRBC COR # BLD AUTO: 7.18 10*3/MM3 (ref 3.4–10.8)

## 2024-08-14 PROCEDURE — 80076 HEPATIC FUNCTION PANEL: CPT | Performed by: INTERNAL MEDICINE

## 2024-08-14 PROCEDURE — 82550 ASSAY OF CK (CPK): CPT | Performed by: INTERNAL MEDICINE

## 2024-08-14 PROCEDURE — 82565 ASSAY OF CREATININE: CPT | Performed by: INTERNAL MEDICINE

## 2024-08-14 PROCEDURE — 85025 COMPLETE CBC W/AUTO DIFF WBC: CPT | Performed by: INTERNAL MEDICINE

## 2024-08-14 PROCEDURE — 84520 ASSAY OF UREA NITROGEN: CPT | Performed by: INTERNAL MEDICINE

## 2024-08-14 PROCEDURE — 36592 COLLECT BLOOD FROM PICC: CPT

## 2024-08-14 PROCEDURE — 86140 C-REACTIVE PROTEIN: CPT | Performed by: INTERNAL MEDICINE

## 2024-08-14 RX ORDER — SODIUM CHLORIDE 0.9 % (FLUSH) 0.9 %
10 SYRINGE (ML) INJECTION AS NEEDED
OUTPATIENT
Start: 2024-08-21

## 2024-08-15 NOTE — ADDENDUM NOTE
Encounter addended by: Jeff Polanco RN on: 8/15/2024 12:18 PM   Actions taken: Specialty comments modified

## 2024-08-21 ENCOUNTER — HOSPITAL ENCOUNTER (OUTPATIENT)
Dept: INFUSION THERAPY | Facility: HOSPITAL | Age: 39
Discharge: HOME | End: 2024-08-21
Admitting: INTERNAL MEDICINE
Payer: COMMERCIAL

## 2024-08-21 VITALS
SYSTOLIC BLOOD PRESSURE: 127 MMHG | DIASTOLIC BLOOD PRESSURE: 77 MMHG | TEMPERATURE: 97.5 F | OXYGEN SATURATION: 95 % | RESPIRATION RATE: 18 BRPM | HEART RATE: 70 BPM

## 2024-08-21 DIAGNOSIS — M86.172 ACUTE OSTEOMYELITIS OF LEFT ANKLE OR FOOT: Primary | ICD-10-CM

## 2024-08-21 LAB
ALBUMIN SERPL-MCNC: 4 G/DL (ref 3.5–5.2)
ALP SERPL-CCNC: 92 U/L (ref 39–117)
ALT SERPL W P-5'-P-CCNC: 18 U/L (ref 1–41)
AST SERPL-CCNC: 18 U/L (ref 1–40)
BASOPHILS # BLD AUTO: 0.05 10*3/MM3 (ref 0–0.2)
BASOPHILS NFR BLD AUTO: 0.6 % (ref 0–1.5)
BILIRUB CONJ SERPL-MCNC: <0.2 MG/DL (ref 0–0.3)
BILIRUB INDIRECT SERPL-MCNC: NORMAL MG/DL
BILIRUB SERPL-MCNC: 0.3 MG/DL (ref 0–1.2)
BUN SERPL-MCNC: 18 MG/DL (ref 6–20)
CK SERPL-CCNC: 82 U/L (ref 20–200)
CREAT SERPL-MCNC: 0.85 MG/DL (ref 0.76–1.27)
CRP SERPL-MCNC: 1.06 MG/DL (ref 0–0.5)
DEPRECATED RDW RBC AUTO: 40.8 FL (ref 37–54)
EGFRCR SERPLBLD CKD-EPI 2021: 112.7 ML/MIN/1.73
EOSINOPHIL # BLD AUTO: 0.46 10*3/MM3 (ref 0–0.4)
EOSINOPHIL NFR BLD AUTO: 5.1 % (ref 0.3–6.2)
ERYTHROCYTE [DISTWIDTH] IN BLOOD BY AUTOMATED COUNT: 13 % (ref 12.3–15.4)
HCT VFR BLD AUTO: 44 % (ref 37.5–51)
HGB BLD-MCNC: 15 G/DL (ref 13–17.7)
IMM GRANULOCYTES # BLD AUTO: 0.05 10*3/MM3 (ref 0–0.05)
IMM GRANULOCYTES NFR BLD AUTO: 0.6 % (ref 0–0.5)
LYMPHOCYTES # BLD AUTO: 1.98 10*3/MM3 (ref 0.7–3.1)
LYMPHOCYTES NFR BLD AUTO: 22 % (ref 19.6–45.3)
MCH RBC QN AUTO: 29.5 PG (ref 26.6–33)
MCHC RBC AUTO-ENTMCNC: 34.1 G/DL (ref 31.5–35.7)
MCV RBC AUTO: 86.4 FL (ref 79–97)
MONOCYTES # BLD AUTO: 0.71 10*3/MM3 (ref 0.1–0.9)
MONOCYTES NFR BLD AUTO: 7.9 % (ref 5–12)
NEUTROPHILS NFR BLD AUTO: 5.73 10*3/MM3 (ref 1.7–7)
NEUTROPHILS NFR BLD AUTO: 63.8 % (ref 42.7–76)
NRBC BLD AUTO-RTO: 0 /100 WBC (ref 0–0.2)
PLATELET # BLD AUTO: 211 10*3/MM3 (ref 140–450)
PMV BLD AUTO: 9.2 FL (ref 6–12)
PROT SERPL-MCNC: 8 G/DL (ref 6–8.5)
RBC # BLD AUTO: 5.09 10*6/MM3 (ref 4.14–5.8)
WBC NRBC COR # BLD AUTO: 8.98 10*3/MM3 (ref 3.4–10.8)

## 2024-08-21 PROCEDURE — 36592 COLLECT BLOOD FROM PICC: CPT

## 2024-08-21 PROCEDURE — 82565 ASSAY OF CREATININE: CPT | Performed by: INTERNAL MEDICINE

## 2024-08-21 PROCEDURE — 80076 HEPATIC FUNCTION PANEL: CPT | Performed by: INTERNAL MEDICINE

## 2024-08-21 PROCEDURE — 84520 ASSAY OF UREA NITROGEN: CPT | Performed by: INTERNAL MEDICINE

## 2024-08-21 PROCEDURE — 85025 COMPLETE CBC W/AUTO DIFF WBC: CPT | Performed by: INTERNAL MEDICINE

## 2024-08-21 PROCEDURE — 86140 C-REACTIVE PROTEIN: CPT | Performed by: INTERNAL MEDICINE

## 2024-08-21 PROCEDURE — 82550 ASSAY OF CK (CPK): CPT | Performed by: INTERNAL MEDICINE

## 2024-08-21 RX ORDER — SODIUM CHLORIDE 0.9 % (FLUSH) 0.9 %
10 SYRINGE (ML) INJECTION AS NEEDED
OUTPATIENT
Start: 2024-08-28

## 2024-08-21 RX ORDER — SODIUM CHLORIDE 0.9 % (FLUSH) 0.9 %
10 SYRINGE (ML) INJECTION AS NEEDED
Status: DISCONTINUED | OUTPATIENT
Start: 2024-08-21 | End: 2024-08-23 | Stop reason: HOSPADM

## 2024-08-30 ENCOUNTER — HOSPITAL ENCOUNTER (OUTPATIENT)
Facility: HOSPITAL | Age: 39
Discharge: HOME | End: 2024-08-30
Payer: COMMERCIAL

## 2024-08-30 VITALS
TEMPERATURE: 99 F | DIASTOLIC BLOOD PRESSURE: 85 MMHG | SYSTOLIC BLOOD PRESSURE: 132 MMHG | HEART RATE: 81 BPM | RESPIRATION RATE: 16 BRPM

## 2024-08-30 DIAGNOSIS — M86.172 ACUTE OSTEOMYELITIS OF LEFT ANKLE OR FOOT: Primary | ICD-10-CM

## 2024-08-30 LAB
ALBUMIN SERPL-MCNC: 4 G/DL (ref 3.5–5.2)
ALP SERPL-CCNC: 86 U/L (ref 39–117)
ALT SERPL W P-5'-P-CCNC: 20 U/L (ref 1–41)
AST SERPL-CCNC: 21 U/L (ref 1–40)
BASOPHILS # BLD AUTO: 0.03 10*3/MM3 (ref 0–0.2)
BASOPHILS NFR BLD AUTO: 0.5 % (ref 0–1.5)
BILIRUB CONJ SERPL-MCNC: <0.2 MG/DL (ref 0–0.3)
BILIRUB INDIRECT SERPL-MCNC: NORMAL MG/DL
BILIRUB SERPL-MCNC: 0.2 MG/DL (ref 0–1.2)
BUN SERPL-MCNC: 16 MG/DL (ref 6–20)
CK SERPL-CCNC: 85 U/L (ref 20–200)
CREAT SERPL-MCNC: 0.92 MG/DL (ref 0.76–1.27)
CRP SERPL-MCNC: 1.35 MG/DL (ref 0–0.5)
DEPRECATED RDW RBC AUTO: 41.3 FL (ref 37–54)
EGFRCR SERPLBLD CKD-EPI 2021: 107.8 ML/MIN/1.73
EOSINOPHIL # BLD AUTO: 0.39 10*3/MM3 (ref 0–0.4)
EOSINOPHIL NFR BLD AUTO: 6.5 % (ref 0.3–6.2)
ERYTHROCYTE [DISTWIDTH] IN BLOOD BY AUTOMATED COUNT: 12.9 % (ref 12.3–15.4)
HCT VFR BLD AUTO: 44.1 % (ref 37.5–51)
HGB BLD-MCNC: 14.9 G/DL (ref 13–17.7)
IMM GRANULOCYTES # BLD AUTO: 0.02 10*3/MM3 (ref 0–0.05)
IMM GRANULOCYTES NFR BLD AUTO: 0.3 % (ref 0–0.5)
LYMPHOCYTES # BLD AUTO: 1.76 10*3/MM3 (ref 0.7–3.1)
LYMPHOCYTES NFR BLD AUTO: 29.4 % (ref 19.6–45.3)
MCH RBC QN AUTO: 29.4 PG (ref 26.6–33)
MCHC RBC AUTO-ENTMCNC: 33.8 G/DL (ref 31.5–35.7)
MCV RBC AUTO: 87.2 FL (ref 79–97)
MONOCYTES # BLD AUTO: 0.59 10*3/MM3 (ref 0.1–0.9)
MONOCYTES NFR BLD AUTO: 9.8 % (ref 5–12)
NEUTROPHILS NFR BLD AUTO: 3.2 10*3/MM3 (ref 1.7–7)
NEUTROPHILS NFR BLD AUTO: 53.5 % (ref 42.7–76)
NRBC BLD AUTO-RTO: 0 /100 WBC (ref 0–0.2)
PLATELET # BLD AUTO: 211 10*3/MM3 (ref 140–450)
PMV BLD AUTO: 9.1 FL (ref 6–12)
PROT SERPL-MCNC: 8.1 G/DL (ref 6–8.5)
RBC # BLD AUTO: 5.06 10*6/MM3 (ref 4.14–5.8)
WBC NRBC COR # BLD AUTO: 5.99 10*3/MM3 (ref 3.4–10.8)

## 2024-08-30 PROCEDURE — 85025 COMPLETE CBC W/AUTO DIFF WBC: CPT | Performed by: INTERNAL MEDICINE

## 2024-08-30 PROCEDURE — 80076 HEPATIC FUNCTION PANEL: CPT | Performed by: INTERNAL MEDICINE

## 2024-08-30 PROCEDURE — 82565 ASSAY OF CREATININE: CPT | Performed by: INTERNAL MEDICINE

## 2024-08-30 PROCEDURE — 86140 C-REACTIVE PROTEIN: CPT | Performed by: INTERNAL MEDICINE

## 2024-08-30 PROCEDURE — 84520 ASSAY OF UREA NITROGEN: CPT | Performed by: INTERNAL MEDICINE

## 2024-08-30 PROCEDURE — 82550 ASSAY OF CK (CPK): CPT | Performed by: INTERNAL MEDICINE

## 2024-08-30 PROCEDURE — 36591 DRAW BLOOD OFF VENOUS DEVICE: CPT

## 2024-08-30 RX ORDER — SODIUM CHLORIDE 0.9 % (FLUSH) 0.9 %
10 SYRINGE (ML) INJECTION AS NEEDED
OUTPATIENT
Start: 2024-09-04

## 2024-08-30 RX ORDER — SODIUM CHLORIDE 0.9 % (FLUSH) 0.9 %
10 SYRINGE (ML) INJECTION AS NEEDED
Status: DISCONTINUED | OUTPATIENT
Start: 2024-08-30 | End: 2024-08-31 | Stop reason: HOSPADM

## 2024-09-04 ENCOUNTER — HOSPITAL ENCOUNTER (OUTPATIENT)
Facility: HOSPITAL | Age: 39
Discharge: HOME | End: 2024-09-04
Admitting: INTERNAL MEDICINE
Payer: COMMERCIAL

## 2024-09-04 DIAGNOSIS — M86.172 ACUTE OSTEOMYELITIS OF LEFT ANKLE OR FOOT: Primary | ICD-10-CM

## 2024-09-04 LAB
ALBUMIN SERPL-MCNC: 4.1 G/DL (ref 3.5–5.2)
ALP SERPL-CCNC: 89 U/L (ref 39–117)
ALT SERPL W P-5'-P-CCNC: 21 U/L (ref 1–41)
AST SERPL-CCNC: 18 U/L (ref 1–40)
BASOPHILS # BLD AUTO: 0.03 10*3/MM3 (ref 0–0.2)
BASOPHILS NFR BLD AUTO: 0.5 % (ref 0–1.5)
BILIRUB CONJ SERPL-MCNC: <0.2 MG/DL (ref 0–0.3)
BILIRUB INDIRECT SERPL-MCNC: NORMAL MG/DL
BILIRUB SERPL-MCNC: 0.2 MG/DL (ref 0–1.2)
BUN SERPL-MCNC: 18 MG/DL (ref 6–20)
CK SERPL-CCNC: 82 U/L (ref 20–200)
CREAT SERPL-MCNC: 0.86 MG/DL (ref 0.76–1.27)
CRP SERPL-MCNC: 0.71 MG/DL (ref 0–0.5)
DEPRECATED RDW RBC AUTO: 41.2 FL (ref 37–54)
EGFRCR SERPLBLD CKD-EPI 2021: 112.3 ML/MIN/1.73
EOSINOPHIL # BLD AUTO: 0.32 10*3/MM3 (ref 0–0.4)
EOSINOPHIL NFR BLD AUTO: 4.9 % (ref 0.3–6.2)
ERYTHROCYTE [DISTWIDTH] IN BLOOD BY AUTOMATED COUNT: 13.1 % (ref 12.3–15.4)
HCT VFR BLD AUTO: 44.5 % (ref 37.5–51)
HGB BLD-MCNC: 14.9 G/DL (ref 13–17.7)
IMM GRANULOCYTES # BLD AUTO: 0.03 10*3/MM3 (ref 0–0.05)
IMM GRANULOCYTES NFR BLD AUTO: 0.5 % (ref 0–0.5)
LYMPHOCYTES # BLD AUTO: 1.76 10*3/MM3 (ref 0.7–3.1)
LYMPHOCYTES NFR BLD AUTO: 26.9 % (ref 19.6–45.3)
MCH RBC QN AUTO: 29 PG (ref 26.6–33)
MCHC RBC AUTO-ENTMCNC: 33.5 G/DL (ref 31.5–35.7)
MCV RBC AUTO: 86.6 FL (ref 79–97)
MONOCYTES # BLD AUTO: 0.66 10*3/MM3 (ref 0.1–0.9)
MONOCYTES NFR BLD AUTO: 10.1 % (ref 5–12)
NEUTROPHILS NFR BLD AUTO: 3.75 10*3/MM3 (ref 1.7–7)
NEUTROPHILS NFR BLD AUTO: 57.1 % (ref 42.7–76)
NRBC BLD AUTO-RTO: 0 /100 WBC (ref 0–0.2)
PLATELET # BLD AUTO: 209 10*3/MM3 (ref 140–450)
PMV BLD AUTO: 9.4 FL (ref 6–12)
PROT SERPL-MCNC: 8.3 G/DL (ref 6–8.5)
RBC # BLD AUTO: 5.14 10*6/MM3 (ref 4.14–5.8)
WBC NRBC COR # BLD AUTO: 6.55 10*3/MM3 (ref 3.4–10.8)

## 2024-09-04 PROCEDURE — 86140 C-REACTIVE PROTEIN: CPT | Performed by: INTERNAL MEDICINE

## 2024-09-04 PROCEDURE — 84520 ASSAY OF UREA NITROGEN: CPT | Performed by: INTERNAL MEDICINE

## 2024-09-04 PROCEDURE — 36592 COLLECT BLOOD FROM PICC: CPT

## 2024-09-04 PROCEDURE — 82565 ASSAY OF CREATININE: CPT | Performed by: INTERNAL MEDICINE

## 2024-09-04 PROCEDURE — 85025 COMPLETE CBC W/AUTO DIFF WBC: CPT | Performed by: INTERNAL MEDICINE

## 2024-09-04 PROCEDURE — 82550 ASSAY OF CK (CPK): CPT | Performed by: INTERNAL MEDICINE

## 2024-09-04 PROCEDURE — 80076 HEPATIC FUNCTION PANEL: CPT | Performed by: INTERNAL MEDICINE

## 2024-09-04 RX ORDER — SODIUM CHLORIDE 0.9 % (FLUSH) 0.9 %
10 SYRINGE (ML) INJECTION AS NEEDED
OUTPATIENT
Start: 2024-09-11

## 2024-09-04 RX ORDER — SODIUM CHLORIDE 0.9 % (FLUSH) 0.9 %
10 SYRINGE (ML) INJECTION AS NEEDED
Status: DISCONTINUED | OUTPATIENT
Start: 2024-09-04 | End: 2024-09-05 | Stop reason: HOSPADM

## 2024-09-12 ENCOUNTER — HOSPITAL ENCOUNTER (OUTPATIENT)
Facility: HOSPITAL | Age: 39
Discharge: HOME | End: 2024-09-12
Admitting: INTERNAL MEDICINE
Payer: COMMERCIAL

## 2024-09-12 VITALS
OXYGEN SATURATION: 94 % | RESPIRATION RATE: 18 BRPM | TEMPERATURE: 97.9 F | HEART RATE: 80 BPM | DIASTOLIC BLOOD PRESSURE: 91 MMHG | SYSTOLIC BLOOD PRESSURE: 138 MMHG

## 2024-09-12 DIAGNOSIS — M86.172 ACUTE OSTEOMYELITIS OF LEFT ANKLE OR FOOT: Primary | ICD-10-CM

## 2024-09-12 LAB
ALBUMIN SERPL-MCNC: 4 G/DL (ref 3.5–5.2)
ALP SERPL-CCNC: 84 U/L (ref 39–117)
ALT SERPL W P-5'-P-CCNC: 21 U/L (ref 1–41)
AST SERPL-CCNC: 19 U/L (ref 1–40)
BASOPHILS # BLD AUTO: 0.03 10*3/MM3 (ref 0–0.2)
BASOPHILS NFR BLD AUTO: 0.4 % (ref 0–1.5)
BILIRUB CONJ SERPL-MCNC: <0.2 MG/DL (ref 0–0.3)
BILIRUB INDIRECT SERPL-MCNC: NORMAL MG/DL
BILIRUB SERPL-MCNC: 0.2 MG/DL (ref 0–1.2)
BUN SERPL-MCNC: 12 MG/DL (ref 6–20)
CK SERPL-CCNC: 74 U/L (ref 20–200)
CREAT SERPL-MCNC: 0.88 MG/DL (ref 0.76–1.27)
CRP SERPL-MCNC: 2.23 MG/DL (ref 0–0.5)
DEPRECATED RDW RBC AUTO: 41.7 FL (ref 37–54)
EGFRCR SERPLBLD CKD-EPI 2021: 111.5 ML/MIN/1.73
EOSINOPHIL # BLD AUTO: 0.09 10*3/MM3 (ref 0–0.4)
EOSINOPHIL NFR BLD AUTO: 1.3 % (ref 0.3–6.2)
ERYTHROCYTE [DISTWIDTH] IN BLOOD BY AUTOMATED COUNT: 13.3 % (ref 12.3–15.4)
HCT VFR BLD AUTO: 44.2 % (ref 37.5–51)
HGB BLD-MCNC: 14.7 G/DL (ref 13–17.7)
IMM GRANULOCYTES # BLD AUTO: 0.05 10*3/MM3 (ref 0–0.05)
IMM GRANULOCYTES NFR BLD AUTO: 0.7 % (ref 0–0.5)
LYMPHOCYTES # BLD AUTO: 1.24 10*3/MM3 (ref 0.7–3.1)
LYMPHOCYTES NFR BLD AUTO: 17.3 % (ref 19.6–45.3)
MCH RBC QN AUTO: 29 PG (ref 26.6–33)
MCHC RBC AUTO-ENTMCNC: 33.3 G/DL (ref 31.5–35.7)
MCV RBC AUTO: 87.2 FL (ref 79–97)
MONOCYTES # BLD AUTO: 0.6 10*3/MM3 (ref 0.1–0.9)
MONOCYTES NFR BLD AUTO: 8.4 % (ref 5–12)
NEUTROPHILS NFR BLD AUTO: 5.15 10*3/MM3 (ref 1.7–7)
NEUTROPHILS NFR BLD AUTO: 71.9 % (ref 42.7–76)
NRBC BLD AUTO-RTO: 0 /100 WBC (ref 0–0.2)
PLATELET # BLD AUTO: 260 10*3/MM3 (ref 140–450)
PMV BLD AUTO: 9.4 FL (ref 6–12)
PROT SERPL-MCNC: 8 G/DL (ref 6–8.5)
RBC # BLD AUTO: 5.07 10*6/MM3 (ref 4.14–5.8)
WBC NRBC COR # BLD AUTO: 7.16 10*3/MM3 (ref 3.4–10.8)

## 2024-09-12 PROCEDURE — 80076 HEPATIC FUNCTION PANEL: CPT | Performed by: INTERNAL MEDICINE

## 2024-09-12 PROCEDURE — 36592 COLLECT BLOOD FROM PICC: CPT

## 2024-09-12 PROCEDURE — 86140 C-REACTIVE PROTEIN: CPT | Performed by: INTERNAL MEDICINE

## 2024-09-12 PROCEDURE — 85025 COMPLETE CBC W/AUTO DIFF WBC: CPT | Performed by: INTERNAL MEDICINE

## 2024-09-12 PROCEDURE — 84520 ASSAY OF UREA NITROGEN: CPT | Performed by: INTERNAL MEDICINE

## 2024-09-12 PROCEDURE — 82565 ASSAY OF CREATININE: CPT | Performed by: INTERNAL MEDICINE

## 2024-09-12 PROCEDURE — 36589 REMOVAL TUNNELED CV CATH: CPT

## 2024-09-12 PROCEDURE — 82550 ASSAY OF CK (CPK): CPT | Performed by: INTERNAL MEDICINE

## 2024-09-12 RX ORDER — SODIUM CHLORIDE 0.9 % (FLUSH) 0.9 %
10 SYRINGE (ML) INJECTION AS NEEDED
OUTPATIENT
Start: 2024-09-18

## 2024-09-12 NOTE — CODE DOCUMENTATION
Picc line removed using sterile technique in a reclined position. Pressure applied for 3-5 minutes and vaseline gauze dressing applied. Patient educated on care for site and given instructions. Patient verbalized understanding. No bleeding noted.

## 2024-10-03 ENCOUNTER — TRANSCRIBE ORDERS (OUTPATIENT)
Dept: GENERAL RADIOLOGY | Facility: HOSPITAL | Age: 39
End: 2024-10-03
Payer: COMMERCIAL

## 2024-10-03 ENCOUNTER — HOSPITAL ENCOUNTER (OUTPATIENT)
Dept: GENERAL RADIOLOGY | Facility: HOSPITAL | Age: 39
Discharge: HOME | End: 2024-10-03
Admitting: INTERNAL MEDICINE
Payer: COMMERCIAL

## 2024-10-03 DIAGNOSIS — L02.612 ABSCESS OF LEFT FOOT: Primary | ICD-10-CM

## 2024-10-03 DIAGNOSIS — L02.612 ABSCESS OF LEFT FOOT: ICD-10-CM

## 2024-10-03 PROCEDURE — 73630 X-RAY EXAM OF FOOT: CPT

## 2024-10-09 ENCOUNTER — APPOINTMENT (OUTPATIENT)
Dept: MRI IMAGING | Facility: HOSPITAL | Age: 39
End: 2024-10-09
Payer: COMMERCIAL

## 2024-10-09 ENCOUNTER — HOSPITAL ENCOUNTER (INPATIENT)
Facility: HOSPITAL | Age: 39
LOS: 3 days | Discharge: HOME HEALTH | End: 2024-10-12
Attending: INTERNAL MEDICINE | Admitting: INTERNAL MEDICINE
Payer: COMMERCIAL

## 2024-10-09 DIAGNOSIS — M86.172 ACUTE OSTEOMYELITIS OF LEFT ANKLE OR FOOT: Primary | ICD-10-CM

## 2024-10-09 PROBLEM — M86.9 FOOT OSTEOMYELITIS, LEFT: Status: ACTIVE | Noted: 2024-10-09

## 2024-10-09 PROBLEM — M86.179 OSTEOMYELITIS OF ANKLE OR FOOT, ACUTE: Status: ACTIVE | Noted: 2024-10-09

## 2024-10-09 LAB
ALBUMIN SERPL-MCNC: 3.7 G/DL (ref 3.5–5.2)
ALBUMIN/GLOB SERPL: 1 G/DL
ALP SERPL-CCNC: 88 U/L (ref 39–117)
ALT SERPL W P-5'-P-CCNC: 17 U/L (ref 1–41)
ANION GAP SERPL CALCULATED.3IONS-SCNC: 12.5 MMOL/L (ref 5–15)
AST SERPL-CCNC: 25 U/L (ref 1–40)
BASOPHILS # BLD AUTO: 0.02 10*3/MM3 (ref 0–0.2)
BASOPHILS NFR BLD AUTO: 0.3 % (ref 0–1.5)
BILIRUB SERPL-MCNC: <0.2 MG/DL (ref 0–1.2)
BUN SERPL-MCNC: 10 MG/DL (ref 6–20)
BUN/CREAT SERPL: 9.8 (ref 7–25)
CALCIUM SPEC-SCNC: 8.7 MG/DL (ref 8.6–10.5)
CHLORIDE SERPL-SCNC: 102 MMOL/L (ref 98–107)
CO2 SERPL-SCNC: 20.5 MMOL/L (ref 22–29)
CREAT SERPL-MCNC: 1.02 MG/DL (ref 0.76–1.27)
DEPRECATED RDW RBC AUTO: 46.5 FL (ref 37–54)
EGFRCR SERPLBLD CKD-EPI 2021: 94.7 ML/MIN/1.73
EOSINOPHIL # BLD AUTO: 0.34 10*3/MM3 (ref 0–0.4)
EOSINOPHIL NFR BLD AUTO: 4.7 % (ref 0.3–6.2)
ERYTHROCYTE [DISTWIDTH] IN BLOOD BY AUTOMATED COUNT: 14.3 % (ref 12.3–15.4)
GLOBULIN UR ELPH-MCNC: 3.8 GM/DL
GLUCOSE SERPL-MCNC: 129 MG/DL (ref 65–99)
HCT VFR BLD AUTO: 41.8 % (ref 37.5–51)
HGB BLD-MCNC: 13.6 G/DL (ref 13–17.7)
IMM GRANULOCYTES # BLD AUTO: 0.04 10*3/MM3 (ref 0–0.05)
IMM GRANULOCYTES NFR BLD AUTO: 0.6 % (ref 0–0.5)
LYMPHOCYTES # BLD AUTO: 1.98 10*3/MM3 (ref 0.7–3.1)
LYMPHOCYTES NFR BLD AUTO: 27.7 % (ref 19.6–45.3)
MCH RBC QN AUTO: 29.2 PG (ref 26.6–33)
MCHC RBC AUTO-ENTMCNC: 32.5 G/DL (ref 31.5–35.7)
MCV RBC AUTO: 89.7 FL (ref 79–97)
MONOCYTES # BLD AUTO: 0.65 10*3/MM3 (ref 0.1–0.9)
MONOCYTES NFR BLD AUTO: 9.1 % (ref 5–12)
NEUTROPHILS NFR BLD AUTO: 4.13 10*3/MM3 (ref 1.7–7)
NEUTROPHILS NFR BLD AUTO: 57.6 % (ref 42.7–76)
NRBC BLD AUTO-RTO: 0 /100 WBC (ref 0–0.2)
PLATELET # BLD AUTO: 222 10*3/MM3 (ref 140–450)
PMV BLD AUTO: 9.6 FL (ref 6–12)
POTASSIUM SERPL-SCNC: 4.4 MMOL/L (ref 3.5–5.2)
PROT SERPL-MCNC: 7.5 G/DL (ref 6–8.5)
RBC # BLD AUTO: 4.66 10*6/MM3 (ref 4.14–5.8)
SODIUM SERPL-SCNC: 135 MMOL/L (ref 136–145)
T4 FREE SERPL-MCNC: 1.23 NG/DL (ref 0.92–1.68)
WBC NRBC COR # BLD AUTO: 7.16 10*3/MM3 (ref 3.4–10.8)

## 2024-10-09 PROCEDURE — 25810000003 SODIUM CHLORIDE 0.9 % SOLUTION: Performed by: INTERNAL MEDICINE

## 2024-10-09 PROCEDURE — 73720 MRI LWR EXTREMITY W/O&W/DYE: CPT

## 2024-10-09 PROCEDURE — 25810000003 SODIUM CHLORIDE 0.9 % SOLUTION 500 ML FLEX CONT: Performed by: INTERNAL MEDICINE

## 2024-10-09 PROCEDURE — 25010000002 VANCOMYCIN 1.5 G RECONSTITUTED SOLUTION 1 EACH VIAL: Performed by: INTERNAL MEDICINE

## 2024-10-09 PROCEDURE — C1751 CATH, INF, PER/CENT/MIDLINE: HCPCS

## 2024-10-09 PROCEDURE — 05HY33Z INSERTION OF INFUSION DEVICE INTO UPPER VEIN, PERCUTANEOUS APPROACH: ICD-10-PCS | Performed by: INTERNAL MEDICINE

## 2024-10-09 PROCEDURE — 25010000002 VANCOMYCIN 5 G RECONSTITUTED SOLUTION: Performed by: INTERNAL MEDICINE

## 2024-10-09 PROCEDURE — A9577 INJ MULTIHANCE: HCPCS | Performed by: INTERNAL MEDICINE

## 2024-10-09 PROCEDURE — 80053 COMPREHEN METABOLIC PANEL: CPT | Performed by: INTERNAL MEDICINE

## 2024-10-09 PROCEDURE — 84439 ASSAY OF FREE THYROXINE: CPT | Performed by: INTERNAL MEDICINE

## 2024-10-09 PROCEDURE — 87040 BLOOD CULTURE FOR BACTERIA: CPT | Performed by: INTERNAL MEDICINE

## 2024-10-09 PROCEDURE — 0 GADOBENATE DIMEGLUMINE 529 MG/ML SOLUTION: Performed by: INTERNAL MEDICINE

## 2024-10-09 PROCEDURE — C1894 INTRO/SHEATH, NON-LASER: HCPCS

## 2024-10-09 PROCEDURE — 85025 COMPLETE CBC W/AUTO DIFF WBC: CPT | Performed by: INTERNAL MEDICINE

## 2024-10-09 RX ORDER — ONDANSETRON 2 MG/ML
4 INJECTION INTRAMUSCULAR; INTRAVENOUS EVERY 6 HOURS PRN
Status: DISCONTINUED | OUTPATIENT
Start: 2024-10-09 | End: 2024-10-12 | Stop reason: HOSPADM

## 2024-10-09 RX ORDER — SULFAMETHOXAZOLE/TRIMETHOPRIM 800-160 MG
1 TABLET ORAL 2 TIMES DAILY
Status: ON HOLD | COMMUNITY
Start: 2024-10-02 | End: 2024-10-12

## 2024-10-09 RX ORDER — SODIUM CHLORIDE 0.9 % (FLUSH) 0.9 %
20 SYRINGE (ML) INJECTION AS NEEDED
Status: DISCONTINUED | OUTPATIENT
Start: 2024-10-09 | End: 2024-10-12 | Stop reason: HOSPADM

## 2024-10-09 RX ORDER — SODIUM CHLORIDE 0.9 % (FLUSH) 0.9 %
10 SYRINGE (ML) INJECTION EVERY 12 HOURS SCHEDULED
Status: DISCONTINUED | OUTPATIENT
Start: 2024-10-09 | End: 2024-10-12 | Stop reason: HOSPADM

## 2024-10-09 RX ORDER — CITALOPRAM HYDROBROMIDE 40 MG/1
40 TABLET ORAL DAILY
COMMUNITY

## 2024-10-09 RX ORDER — POLYETHYLENE GLYCOL 3350 17 G/17G
17 POWDER, FOR SOLUTION ORAL DAILY PRN
Status: DISCONTINUED | OUTPATIENT
Start: 2024-10-09 | End: 2024-10-12 | Stop reason: HOSPADM

## 2024-10-09 RX ORDER — SODIUM CHLORIDE 0.9 % (FLUSH) 0.9 %
10 SYRINGE (ML) INJECTION AS NEEDED
Status: DISCONTINUED | OUTPATIENT
Start: 2024-10-09 | End: 2024-10-12 | Stop reason: HOSPADM

## 2024-10-09 RX ORDER — GABAPENTIN 400 MG/1
800 CAPSULE ORAL EVERY 12 HOURS SCHEDULED
Status: DISCONTINUED | OUTPATIENT
Start: 2024-10-09 | End: 2024-10-12 | Stop reason: HOSPADM

## 2024-10-09 RX ORDER — LORAZEPAM 0.5 MG/1
0.5 TABLET ORAL EVERY 6 HOURS PRN
Status: DISCONTINUED | OUTPATIENT
Start: 2024-10-09 | End: 2024-10-12 | Stop reason: HOSPADM

## 2024-10-09 RX ORDER — GABAPENTIN 800 MG/1
800 TABLET ORAL 2 TIMES DAILY
COMMUNITY

## 2024-10-09 RX ORDER — IBUPROFEN 200 MG
200 TABLET ORAL EVERY 4 HOURS PRN
Status: DISCONTINUED | OUTPATIENT
Start: 2024-10-09 | End: 2024-10-12 | Stop reason: HOSPADM

## 2024-10-09 RX ORDER — AMOXICILLIN 250 MG
2 CAPSULE ORAL 2 TIMES DAILY PRN
Status: DISCONTINUED | OUTPATIENT
Start: 2024-10-09 | End: 2024-10-12 | Stop reason: HOSPADM

## 2024-10-09 RX ORDER — NICOTINE 21 MG/24HR
1 PATCH, TRANSDERMAL 24 HOURS TRANSDERMAL EVERY 24 HOURS
Status: DISCONTINUED | OUTPATIENT
Start: 2024-10-09 | End: 2024-10-10

## 2024-10-09 RX ORDER — SODIUM CHLORIDE 9 MG/ML
40 INJECTION, SOLUTION INTRAVENOUS AS NEEDED
Status: DISCONTINUED | OUTPATIENT
Start: 2024-10-09 | End: 2024-10-12 | Stop reason: HOSPADM

## 2024-10-09 RX ORDER — BISACODYL 10 MG
10 SUPPOSITORY, RECTAL RECTAL DAILY PRN
Status: DISCONTINUED | OUTPATIENT
Start: 2024-10-09 | End: 2024-10-12 | Stop reason: HOSPADM

## 2024-10-09 RX ORDER — CITALOPRAM HYDROBROMIDE 20 MG/1
40 TABLET ORAL DAILY
Status: DISCONTINUED | OUTPATIENT
Start: 2024-10-09 | End: 2024-10-12 | Stop reason: HOSPADM

## 2024-10-09 RX ORDER — BISACODYL 5 MG/1
5 TABLET, DELAYED RELEASE ORAL DAILY PRN
Status: DISCONTINUED | OUTPATIENT
Start: 2024-10-09 | End: 2024-10-12 | Stop reason: HOSPADM

## 2024-10-09 RX ADMIN — VANCOMYCIN HYDROCHLORIDE 1500 MG: 1.5 INJECTION, POWDER, LYOPHILIZED, FOR SOLUTION INTRAVENOUS at 23:55

## 2024-10-09 RX ADMIN — LORAZEPAM 0.5 MG: 0.5 TABLET ORAL at 13:23

## 2024-10-09 RX ADMIN — APIXABAN 5 MG: 5 TABLET, FILM COATED ORAL at 20:18

## 2024-10-09 RX ADMIN — GADOBENATE DIMEGLUMINE 30 ML: 529 INJECTION, SOLUTION INTRAVENOUS at 17:19

## 2024-10-09 RX ADMIN — Medication 10 ML: at 16:15

## 2024-10-09 RX ADMIN — GABAPENTIN 800 MG: 400 CAPSULE ORAL at 20:18

## 2024-10-09 RX ADMIN — VANCOMYCIN HYDROCHLORIDE 2500 MG: 5 INJECTION, POWDER, LYOPHILIZED, FOR SOLUTION INTRAVENOUS at 16:15

## 2024-10-09 RX ADMIN — Medication 10 ML: at 21:20

## 2024-10-09 RX ADMIN — Medication 10 ML: at 20:17

## 2024-10-09 RX ADMIN — NICOTINE 1 PATCH: 21 PATCH TRANSDERMAL at 13:18

## 2024-10-09 RX ADMIN — LORAZEPAM 0.5 MG: 0.5 TABLET ORAL at 20:18

## 2024-10-09 NOTE — PLAN OF CARE
Goal Outcome Evaluation:  Plan of Care Reviewed With: patient        Progress: no change  Outcome Evaluation: Pt admitted this afternoon as a direct admit from Dr. Logan's office.  RUE PICC placed, IV Vancomycin administered. MRI of left foot completed. Pt reports recent x-ray concernign for osteo so MD wanted to admit him. Podiatry consulted.

## 2024-10-09 NOTE — PROGRESS NOTES
"Pharmacy Consult-Vancomycin Dosing    J Luis Estrada is a  39 y.o. male receiving vancomycin therapy.     Indication: Bone/Joint Infection  Consulting Provider: Dr. Logan    Goal AUC: 400-600 mg/:L*hr    Current Antimicrobial Therapy  Anti-Infectives (From admission, onward)      Ordered     Dose/Rate Route Frequency Start Stop    10/09/24 1521  vancomycin (VANCOCIN) 1,500 mg in sodium chloride 0.9 % 500 mL IVPB        Ordering Provider: Marquez Logan MD    1,500 mg  333.3 mL/hr over 90 Minutes Intravenous Every 12 Hours 10/10/24 0000 11/23/24 2359    10/09/24 1458  vancomycin 2500 mg/500 mL 0.9% NS IVPB ([facility])        Ordering Provider: Marquez Logan MD    2,500 mg  over 150 Minutes Intravenous Once 10/09/24 1600      10/09/24 1252  Pharmacy to dose vancomycin        Ordering Provider: Marquez Logan MD     Does not apply Daily 10/09/24 1330 11/23/24 0859            Labs  Results from last 7 days   Lab Units 10/09/24  1344 10/09/24  1343   WBC 10*3/mm3  --  7.16   CREATININE mg/dL 1.02  --       Estimated Creatinine Clearance: 156.4 mL/min (by C-G formula based on SCr of 1.02 mg/dL).  Temp Readings from Last 1 Encounters:   10/09/24 97.9 °F (36.6 °C) (Oral)       Microbiology Culture results  Microbiology Results (last 10 days)       ** No results found for the last 240 hours. **            Evaluation of Dosing     Last Dose Received in the ED/Outside Facility: No  Is Patient on Dialysis or Renal Replacement: No    Ht - 198.1 cm (78\")  Wt - (!) 153 kg (337 lb 1.3 oz)    Evaluation of Level                      [employer] AUC Calculation    Current AUC:   New start    Predicted Steady State AUC on Current Dose: New start  _________________________________    Predicted Steady State AUC on New Dose:   544 mg/L*hr    Assessment/Plan    Pharmacy to dose vancomycin for bone/joint infection. Goal -600 mg/L*hr.  Patient will receive loading dose of vancomycin 2500mg (~16.4mg/kg) IV on 10/9 @ ~1600 " (pending IV access). Initiate maintenance dose of vancomycin 1500mg (~9.8mg/kg) IV Q12hr on 10/10 @ 0000.  Assess clearance by vancomycin random level on 10/11 @ 0600.  Pharmacy will continue to monitor renal function, cultures and sensitivities, and clinical status to adjust regimen as necessary.    Thank you for the consult,    Serjio Del Rosario PharmD, BCPS   10/09/24 15:23 EDT

## 2024-10-10 LAB
ALBUMIN SERPL-MCNC: 3.8 G/DL (ref 3.5–5.2)
ALBUMIN/GLOB SERPL: 1 G/DL
ALP SERPL-CCNC: 78 U/L (ref 39–117)
ALT SERPL W P-5'-P-CCNC: 14 U/L (ref 1–41)
ANION GAP SERPL CALCULATED.3IONS-SCNC: 10.1 MMOL/L (ref 5–15)
AST SERPL-CCNC: 18 U/L (ref 1–40)
BILIRUB SERPL-MCNC: 0.2 MG/DL (ref 0–1.2)
BUN SERPL-MCNC: 12 MG/DL (ref 6–20)
BUN/CREAT SERPL: 11.7 (ref 7–25)
CALCIUM SPEC-SCNC: 8.9 MG/DL (ref 8.6–10.5)
CHLORIDE SERPL-SCNC: 104 MMOL/L (ref 98–107)
CO2 SERPL-SCNC: 22.9 MMOL/L (ref 22–29)
CREAT SERPL-MCNC: 1.03 MG/DL (ref 0.76–1.27)
CRP SERPL-MCNC: 0.98 MG/DL (ref 0–0.5)
DEPRECATED RDW RBC AUTO: 44.6 FL (ref 37–54)
EGFRCR SERPLBLD CKD-EPI 2021: 93.6 ML/MIN/1.73
ERYTHROCYTE [DISTWIDTH] IN BLOOD BY AUTOMATED COUNT: 14.2 % (ref 12.3–15.4)
GLOBULIN UR ELPH-MCNC: 3.7 GM/DL
GLUCOSE SERPL-MCNC: 125 MG/DL (ref 65–99)
HBA1C MFR BLD: 6 % (ref 4.8–5.6)
HCT VFR BLD AUTO: 41.7 % (ref 37.5–51)
HGB BLD-MCNC: 14 G/DL (ref 13–17.7)
MCH RBC QN AUTO: 28.7 PG (ref 26.6–33)
MCHC RBC AUTO-ENTMCNC: 33.6 G/DL (ref 31.5–35.7)
MCV RBC AUTO: 85.6 FL (ref 79–97)
MRSA DNA SPEC QL NAA+PROBE: NORMAL
PLATELET # BLD AUTO: 225 10*3/MM3 (ref 140–450)
PMV BLD AUTO: 9.1 FL (ref 6–12)
POTASSIUM SERPL-SCNC: 4.8 MMOL/L (ref 3.5–5.2)
PROT SERPL-MCNC: 7.5 G/DL (ref 6–8.5)
RBC # BLD AUTO: 4.87 10*6/MM3 (ref 4.14–5.8)
SODIUM SERPL-SCNC: 137 MMOL/L (ref 136–145)
TSH SERPL DL<=0.05 MIU/L-ACNC: 1.39 UIU/ML (ref 0.27–4.2)
WBC NRBC COR # BLD AUTO: 7.56 10*3/MM3 (ref 3.4–10.8)

## 2024-10-10 PROCEDURE — 80050 GENERAL HEALTH PANEL: CPT | Performed by: INTERNAL MEDICINE

## 2024-10-10 PROCEDURE — 63710000001 DIPHENHYDRAMINE PER 50 MG: Performed by: INTERNAL MEDICINE

## 2024-10-10 PROCEDURE — 86140 C-REACTIVE PROTEIN: CPT | Performed by: PODIATRIST

## 2024-10-10 PROCEDURE — 87641 MR-STAPH DNA AMP PROBE: CPT | Performed by: INTERNAL MEDICINE

## 2024-10-10 PROCEDURE — 25010000002 METHYLPREDNISOLONE PER 125 MG: Performed by: INTERNAL MEDICINE

## 2024-10-10 PROCEDURE — 25810000003 SODIUM CHLORIDE 0.9 % SOLUTION 500 ML FLEX CONT: Performed by: INTERNAL MEDICINE

## 2024-10-10 PROCEDURE — 25010000002 VANCOMYCIN 1.5 G RECONSTITUTED SOLUTION 1 EACH VIAL: Performed by: INTERNAL MEDICINE

## 2024-10-10 PROCEDURE — 83036 HEMOGLOBIN GLYCOSYLATED A1C: CPT | Performed by: INTERNAL MEDICINE

## 2024-10-10 RX ORDER — LAMOTRIGINE 25 MG/1
25 TABLET ORAL EVERY 12 HOURS SCHEDULED
Status: DISCONTINUED | OUTPATIENT
Start: 2024-10-10 | End: 2024-10-12 | Stop reason: HOSPADM

## 2024-10-10 RX ORDER — NICOTINE 21 MG/24HR
1 PATCH, TRANSDERMAL 24 HOURS TRANSDERMAL
Status: DISCONTINUED | OUTPATIENT
Start: 2024-10-10 | End: 2024-10-12 | Stop reason: HOSPADM

## 2024-10-10 RX ORDER — METHYLPREDNISOLONE SODIUM SUCCINATE 125 MG/2ML
80 INJECTION, POWDER, LYOPHILIZED, FOR SOLUTION INTRAMUSCULAR; INTRAVENOUS ONCE
Status: COMPLETED | OUTPATIENT
Start: 2024-10-10 | End: 2024-10-10

## 2024-10-10 RX ORDER — DIPHENHYDRAMINE HCL 25 MG
25 CAPSULE ORAL ONCE
Status: COMPLETED | OUTPATIENT
Start: 2024-10-10 | End: 2024-10-10

## 2024-10-10 RX ADMIN — LORAZEPAM 0.5 MG: 0.5 TABLET ORAL at 21:05

## 2024-10-10 RX ADMIN — LORAZEPAM 0.5 MG: 0.5 TABLET ORAL at 14:32

## 2024-10-10 RX ADMIN — GABAPENTIN 800 MG: 400 CAPSULE ORAL at 21:06

## 2024-10-10 RX ADMIN — LAMOTRIGINE 25 MG: 25 TABLET ORAL at 09:49

## 2024-10-10 RX ADMIN — LORAZEPAM 0.5 MG: 0.5 TABLET ORAL at 02:45

## 2024-10-10 RX ADMIN — CITALOPRAM HYDROBROMIDE 40 MG: 20 TABLET ORAL at 08:28

## 2024-10-10 RX ADMIN — DIPHENHYDRAMINE HYDROCHLORIDE 25 MG: 25 CAPSULE ORAL at 03:33

## 2024-10-10 RX ADMIN — VANCOMYCIN HYDROCHLORIDE 1500 MG: 1.5 INJECTION, POWDER, LYOPHILIZED, FOR SOLUTION INTRAVENOUS at 23:30

## 2024-10-10 RX ADMIN — METHYLPREDNISOLONE SODIUM SUCCINATE 80 MG: 125 INJECTION, POWDER, FOR SOLUTION INTRAMUSCULAR; INTRAVENOUS at 03:33

## 2024-10-10 RX ADMIN — Medication 10 ML: at 08:30

## 2024-10-10 RX ADMIN — Medication 10 ML: at 21:06

## 2024-10-10 RX ADMIN — NICOTINE POLACRILEX 4 MG: 2 GUM, CHEWING BUCCAL at 00:23

## 2024-10-10 RX ADMIN — GABAPENTIN 800 MG: 400 CAPSULE ORAL at 08:28

## 2024-10-10 RX ADMIN — NICOTINE 1 PATCH: 21 PATCH TRANSDERMAL at 00:37

## 2024-10-10 RX ADMIN — LORAZEPAM 0.5 MG: 0.5 TABLET ORAL at 08:28

## 2024-10-10 RX ADMIN — APIXABAN 5 MG: 5 TABLET, FILM COATED ORAL at 08:28

## 2024-10-10 RX ADMIN — APIXABAN 5 MG: 5 TABLET, FILM COATED ORAL at 21:05

## 2024-10-10 RX ADMIN — VANCOMYCIN HYDROCHLORIDE 1500 MG: 1.5 INJECTION, POWDER, LYOPHILIZED, FOR SOLUTION INTRAVENOUS at 12:43

## 2024-10-10 NOTE — CASE MANAGEMENT/SOCIAL WORK
Discharge Planning Assessment  [facility]     Patient Name: J Luis Estrada  MRN: [id number]  Today's Date: 10/10/2024    Admit Date: 10/9/2024    Plan: Pt awake and alert at time of visit.  Pt confirmed , address, PCP, telehone numbers and :  Uzma Estrada/mother/[phone].  He does not have a POA or LW.  He is not a [place].  Pt reported saw Dr Logan yesterday at office.  Pt reported had been receiving IV antibiotics via PICC at home due to wound and osteomylitis resulting form Fork Lift injury.  He related anxious to have the PICC removed which it was approx 2 weeks ago.  He reported wishing he had continued with the antibiotics for 2 more week.   Pt has had new PICC line placed and anticipated home IV antibiotics again.   He reported his mother had been trained to initiate the infusion.  Pt reported this was set up when he was at [facility] and thinks [facility] arranged for infusions.  He did go to the outpt infusion at [facility] for lab work.   Pt denies having Home Health visit.  The only equipment he has now is a nebulizer which he hasn't used in a long time.  Pt  reports being independent of ADLs.  He plans to visit [facility] [employer] after infusions are completed to assist with job search.   Pt uses [facility].  Meds to Beds explained and pt agreeable to use at [place].  Pt plans to return home at [place].  SDOH completed.   Discharge Needs Assessment       Row Name 10/10/24 1146       Living Environment    People in Home parent(s)    Name(s) of People in Home Uzma Estrada/mother/[phone]    Current Living Arrangements home    Duration at Residence Several years (since childhood)    Potentially Unsafe Housing Conditions unable to assess    In the past 12 months has the electric, gas, oil, or water company threatened to shut off services in your home? No  Reports was hard at time to pay bill.    Primary Care Provided by self    Provides Primary Care For no one    Family  Caregiver if Needed parent(s)    Family Caregiver Names Uzma Estrada    Quality of Family Relationships unable to assess    Able to Return to Prior Arrangements yes       Resource/Environmental Concerns    Transportation Concerns none       Transportation Needs    In the past 12 months, has lack of transportation kept you from medical appointments or from getting medications? yes  Car was not working. Has obtained new car    In the past 12 months, has lack of transportation kept you from meetings, work, or from getting things needed for daily living? Yes       Food Insecurity    Within the past 12 months, you worried that your food would run out before you got the money to buy more. Sometimes    Within the past 12 months, the food you bought just didn't last and you didn't have money to get more. Sometimes       Transition Planning    Patient/Family Anticipates Transition to home with family    Patient/Family Anticipated Services at Transition other (see comments)  May need HH or outpt Infusion to assist with home IV antibiotics       Discharge Needs Assessment    Equipment Currently Used at Home none    Concerns to be Addressed discharge planning;care coordination/care conferences    Concerns Comments Coordination of plan for IV antibiotics.  Mother has already been trained to initiate infusion.   May need HH or outpt infusion for PICC dressing changes    Anticipated Changes Related to Illness other (see comments)  Seeking new job may be delayed until after course of  infusions completed    Equipment Needed After Discharge medication pump    Outpatient/Agency/Support Group Needs infusion therapy, home;other (see comments)  Has had IV antibiotics at home recently.    Provided Post Acute Provider List? --  To be determined    Current Discharge Risk chronically ill                   Discharge Plan       Row Name 10/10/24 0966       Plan    Plan Pt awake and alert at time of visit.  Pt confirmed , address, PCP,  telehone numbers and :  Uzma Estrada/mother/[phone].  He does not have a POA or LW.  He is not a [place].  Pt reported saw Dr Logan yesterday at office.  Pt reported had been receiving IV antibiotics via PICC at home due to wound and osteomylitis resulting form Fork Lift injury.  He related anxious to have the PICC removed which it was approx 2 weeks ago.  He reported wishing he had continued with the antibiotics for 2 more week.   Pt has had new PICC line placed and anticipated home IV antibiotics again.   He reported his mother had been trained to initiate the infusion.  Pt reported this was set up when he was at [facility] and thinks [facility] arranged for infusions.  He did go to the outpt infusion at [facility] for lab work.   Pt denies having Home Health visit.  The only equipment he has now is a nebulizer which he hasn't used in a long time.  Pt  reports being independent of ADLs.  He plans to visit the [facility] after infusions are completed to assist with job search.   Pt uses [facility].  Meds to Beds explained and pt agreeable to use at [place].  Pt plans to return home at [place].  SDOH completed.                  Continued Care and Services - Admitted Since 10/9/2024    No active coordination exists for this encounter.          Demographic Summary       Row Name 10/10/24 1141       General Information    Admission Type inpatient    Arrived From home    Required Notices Provided Important Message from Medicare    Expected Length of Stay (LOS) 72 hr    Referral Source admission list    Reason for Consult discharge planning    Preferred Language English       Contact Information    Permission Granted to Share Info With family/designee;    Contact Information Obtained for     Contact Information Comments Uzma Estrada/ Mother/[phone]                   Functional Status       Row Name 10/10/24 1142       Functional Status    Usual Activity Tolerance good     Current Activity Tolerance moderate    Functional Status Comments Foot osteomyelitis has limited activity       Physical Activity    On average, how many days per week do you engage in moderate to strenuous exercise (like a brisk walk)? 0 days  foot osteomyelitis has limited activity    On average, how many minutes do you engage in exercise at this level? 0 min    Number of minutes of exercise per week 0       Functional Status, IADL    Medications independent    Meal Preparation independent    Housekeeping independent    Laundry independent    Shopping independent    IADL Comments Independent of ADLS       Mental Status    General Appearance WDL WDL       Employment/    Employment Status unemployed;other (see comments)  Unemployed at this time.  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                  Abuse/Neglect    No documentation.                  Legal    No documentation.                  Substance Abuse    No documentation.                  Patient Forms    No documentation.                     Tatiana Magdaleno RN

## 2024-10-10 NOTE — PROGRESS NOTES
[facility]  INTERNAL MEDICINE PROGRESS NOTE    Name:  J Luis Estrada   Age:  39 y.o.  Sex:  male  :  1985  MRN:  [id number]   Visit Number:  [id number]  Admission Date:  10/9/2024  Date Of Service:  10/10/24  Primary Care Physician:  Marquez Logan MD     LOS: 1 day :  Patient Care Team:  Marquez Logan MD as PCP - General (Internal Medicine):      Subjective / Interval History:     39-year-old patient with history of bipolar depression and anxiety disorder who is prediabetic has been having worsening of the left foot pain swelling and osteomyelitis of the metatarsal for which he has failed outpatient management and is being admitted for PICC line placement with IV antibiotics and further surgical evaluation.    His PICC line has been placed yesterday and he has been started on vancomycin after cultures.  He is having a lot of swelling of the foot and difficulty in walking with pain.  Podiatry \ Ortho consult still pending      Vital Signs:    Temp:  [97.9 °F (36.6 °C)-98.4 °F (36.9 °C)] 98 °F (36.7 °C)  Heart Rate:  [77-95] 95  Resp:  [18-20] 18  BP: (108-144)/(75-96) 121/85    Intake and output:    I/O last 3 completed shifts:  In: 2400 [P.O.:2400]  Out: -   I/O this shift:  In: 240 [P.O.:240]  Out: -     Physical Examination:    General Appearance:    Alert and cooperative, not in any acute distress.   Head:    Atraumatic and normocephalic, without obvious abnormality.   Eyes:            PERRLA,  No pallor. Extraocular movements are within normal limits.   Neck:   Supple,  No lymph glands, no bruit.   Lungs:     Chest shape is normal. Breath sounds heard bilaterally equally.  No crackles or wheezing.     Heart:    Normal S1 and S2, no murmur, no JVD.   Abdomen:     Normal bowel sounds, no masses, no organomegaly. Soft     nontender, no guarding, no rebound tenderness.   Extremities:   Moves all extremities well, left foot swelling tenderness over the metatarsal area over  the greater, diffuse foot edema, no cyanosis,    Skin:   No  bruising or rash.   Neurologic:   Grossly nonfocal and moves all extremities.      Laboratory results:  Results from last 7 days   Lab Units 10/10/24  0544 10/09/24  1344   SODIUM mmol/L 137 135*   POTASSIUM mmol/L 4.8 4.4   CHLORIDE mmol/L 104 102   CO2 mmol/L 22.9 20.5*   BUN mg/dL 12 10   CREATININE mg/dL 1.03 1.02   CALCIUM mg/dL 8.9 8.7   BILIRUBIN mg/dL 0.2 <0.2   ALK PHOS U/L 78 88   ALT (SGPT) U/L 14 17   AST (SGOT) U/L 18 25   GLUCOSE mg/dL 125* 129*     Results from last 7 days   Lab Units 10/10/24  0544 10/09/24  1343   WBC 10*3/mm3 7.56 7.16   HEMOGLOBIN g/dL 14.0 13.6   HEMATOCRIT % 41.7 41.8   PLATELETS 10*3/mm3 225 222                   Radiology results:    Imaging Results (Last 24 Hours)       Procedure Component Value Units Date/Time    MRI Foot Left With & Without Contrast [[id number]] Resulted: 10/09/24 1701     Updated: 10/09/24 1720            I have reviewed the patient's radiology reports.    Medication Review:     I have reviewed the patient's active and prn medications.     Assessment:      Foot osteomyelitis, left    Osteomyelitis of ankle or foot, acute  Bipolar depression  Anxiety disorder        Plan:    Left foot osteomyelitis-she has failed conservative treatment in the past she has had 4 weeks of vancomycin it had improved and worsening symptoms have started again  Discussed with him plan of action to do 8 weeks of IV vancomycin  MRI of the foot has been repeated and result pending  Surgical consult to be done and pending evaluation    Bipolar disease and depression-will continue his home medications and restart Lamictal which he had been on but not taking it recently    Goals of treatment plan of care has been explained to the patient in details    Marquez Logan MD  10/10/24  09:18 EDT      Please note that portions of this note were completed with a voice recognition program.

## 2024-10-10 NOTE — CONSULTS
Inpatient Podiatry Consult  Consult performed by: Yudelka De La Torre DPM  Consult ordered by: Marquez Logan MD          Patient Identification:  Name:  J Luis Estrada  Age:  39 y.o.  Sex:  male  :  1985  MRN:  [id number]  Visit Number:  [id number]  Primary care provider:  Marquez Logan MD    Chief complaint: left foot infection    History of presenting illness:  39 year old male with history of bipolar depression and anxiety, prediabetic with HbA1C 6%. He developed a wound on his plantar left 1st metatarsal head earlier this summer and presented to  ED for which he was admitted at the time for osteomyelitis and placed on 6 weeks IV vancomycin and monitored as outpatient by infectious disease. 6 weeks completed and PICC pulled mid-2024. He noticed an increase in swelling to his big toe joint within the past week and presented to his PCP who admitted him for further evaluation for osteomyelitis. He denies any open wounds or draining, denies significant redness, does report mild to moderate pain during WB.  ---------------------------------------------------------------------------------------------------------------------  Review of Systems:   Constitution: No chills, no rigors, no unexplained weight loss or weight gain  Respiratory: No cough, no hemoptysis  Cardiovascular: regular rate and rhythm  Gastrointestinal: No nausea, no vomiting, no hematemesis, no diarrhea or constipation, no melena  Integument: No pruritis and  no skin rash  Musculoskeletal: No joint pain, joint stiffness, joint swelling, muscle pain, muscle weakness and neck pain  Neurological: No dizziness, headaches, light headedness, seizures and vertigo  Endocrine: No frequent urination and nocturia, temperature intolerance, weight gain, unintended and weight loss, unintended  ---------------------------------------------------------------------------------------------------------------------   Past  History:  Family History   Problem Relation Age of Onset    Gout Other     Osteoarthritis Other     Rheum arthritis Other     Diabetes Other      Past Medical History:   Diagnosis Date    Anxiety     Bipolar affective disorder, currently active     ON MEDICATION    DVT (deep venous thrombosis)     RIGHT LEG -2012    Hypertension     PT DENIES ANY HTN.  BUT STATES IT DOES GO UP AT TIMES.    Migraine     Neuropathy     S/P foot surgery, right 2021    Seizure     LAST ONE WAS 4/2017.  STATES NOT ON ANY MEDICATION FOR.      Uses contact lenses      Past Surgical History:   Procedure Laterality Date    ADENOIDECTOMY      EXCISION MASS LEG Left 5/10/2017    Procedure: Left foot soft tissue mass/plantar fibroma excision;  Surgeon: Lucius Olguin DPM;  Location: [facility] OR;  Service:     TONSILLECTOMY AND ADENOIDECTOMY      WISDOM TOOTH EXTRACTION       Social History     Socioeconomic History    Marital status:    Tobacco Use    Smoking status: Every Day     Current packs/day: 1.00     Average packs/day: 1 pack/day for 35.8 years (35.8 ttl pk-yrs)     Types: Cigarettes     Start date: 2003    Smokeless tobacco: Never   Vaping Use    Vaping status: Never Used   Substance and Sexual Activity    Alcohol use: Not Currently     Comment: Pt reports he drank socially    Drug use: Not Currently     Types: Hydrocodone     Comment: previously.  2013 WAS LAST USE.      Sexual activity: Defer     ---------------------------------------------------------------------------------------------------------------------   Allergies:  Penicillins and Vancomycin  ---------------------------------------------------------------------------------------------------------------------   Prior to Admission Medications       Prescriptions Last Dose Informant Patient Reported? Taking?    albuterol (PROVENTIL) (2.5 MG/3ML) 0.083% nebulizer solution   No Yes    Take 2.5 mg by nebulization Every 4 (Four) Hours As Needed for Wheezing.    apixaban  (ELIQUIS) 5 MG tablet tablet 10/8/2024  Yes Yes    Take  by mouth 2 (Two) Times a Day. Pt is unsure of dose    citalopram (CeleXA) 40 MG tablet 10/8/2024  Yes Yes    Take 1 tablet by mouth Daily.    diphenhydrAMINE (BENADRYL) 25 mg capsule  Self Yes Yes    Take 1 capsule by mouth Every 6 (Six) Hours As Needed for Itching.    gabapentin (NEURONTIN) 800 MG tablet 10/8/2024  Yes Yes    Take 1 tablet by mouth 2 (Two) Times a Day.    ibuprofen (ADVIL,MOTRIN) 800 MG tablet   No Yes    Take 1 tablet by mouth Every 8 (Eight) Hours As Needed (pain).    lamoTRIgine (LaMICtal) 150 MG tablet Not Taking  Yes No    Take 100 mg by mouth Daily. Pt states he ran out and needs to get back on it.    Patient not taking:  Reported on 10/9/2024    loratadine (CLARITIN) 10 MG tablet 10/8/2024  No Yes    Take 1 tablet by mouth Daily.    NON FORMULARY Past Month  Yes Yes    Inject 100 mg under the skin into the appropriate area as directed Every 30 (Thirty) Days. Sublocade(Suboxen), Pt states he usually takes it on the 9th of each month    ondansetron ODT (ZOFRAN-ODT) 4 MG disintegrating tablet   No Yes    Take 1 tablet by mouth Every 8 (Eight) Hours As Needed for Nausea or Vomiting.    sulfamethoxazole-trimethoprim (BACTRIM DS,SEPTRA DS) 800-160 MG per tablet   Yes Yes    Take 1 tablet by mouth 2 (Two) Times a Day. [facility]          [facility] Meds:  apixaban, 5 mg, Oral, Q12H  citalopram, 40 mg, Oral, Daily  gabapentin, 800 mg, Oral, Q12H  lamoTRIgine, 25 mg, Oral, Q12H  nicotine, 1 patch, Transdermal, Q24H  Pharmacy to dose vancomycin, , Does not apply, Daily  sodium chloride, 10 mL, Intravenous, Q12H  sodium chloride, 10 mL, Intravenous, Q12H  vancomycin, 1,500 mg, Intravenous, Q12H         ---------------------------------------------------------------------------------------------------------------------   Vital Signs:  Temp:  [97.8 °F (36.6 °C)-98.2 °F (36.8 °C)] 97.8 °F (36.6 °C)  Heart Rate:  [80-95] 95  Resp:   "[18-20] 20  BP: (108-142)/(75-88) 142/84      10/09/24  1153   Weight: (!) 153 kg (337 lb 1.3 oz)     Body mass index is 38.95 kg/m².  ---------------------------------------------------------------------------------------------------------------------   Physical exam:  Vascular: 2/4 DP and 2/4 PT bilateral. Brisk capillary refill all digits. Diminished hair growth. Skin temperature warm to cool gradient proximal leg to foot. No skin discoloration. Varicosities absent.    Neurological: Vibratory, light touch, and proprioception diminished bilateral feet.    Dermatological: Moderate edema left hallux and 1st MTPJ, no erythema, minimal warmth, hyperkeratosis with no subepidermal bleeding plantar left 1st metatarsal head    Musculoskeletal: MMT 5/5 GS/FHL/TA. Moderate left bunion, pain on palpation left 1st MTPJ and hallux IPJ with crepitus with ROM. All compartments soft and compressible. Mild Gastrosoleus contracture. Hammertoes 2-5 bilateral foot. No gross deformity of the bilateral lower extremities    ---------------------------------------------------------------------------------------------------------------------   Results from last 7 days   Lab Units 10/10/24  0544 10/09/24  1343   WBC 10*3/mm3 7.56 7.16   HEMOGLOBIN g/dL 14.0 13.6   HEMATOCRIT % 41.7 41.8   MCV fL 85.6 89.7   MCHC g/dL 33.6 32.5   PLATELETS 10*3/mm3 225 222         Results from last 7 days   Lab Units 10/10/24  0544 10/09/24  1344   SODIUM mmol/L 137 135*   POTASSIUM mmol/L 4.8 4.4   CHLORIDE mmol/L 104 102   CO2 mmol/L 22.9 20.5*   BUN mg/dL 12 10   CREATININE mg/dL 1.03 1.02   CALCIUM mg/dL 8.9 8.7   GLUCOSE mg/dL 125* 129*   ALBUMIN g/dL 3.8 3.7   BILIRUBIN mg/dL 0.2 <0.2   ALK PHOS U/L 78 88   AST (SGOT) U/L 18 25   ALT (SGPT) U/L 14 17   Estimated Creatinine Clearance: 154.9 mL/min (by C-G formula based on SCr of 1.03 mg/dL).  No results found for: \"AMMONIA\"          Lab Results   Component Value Date    HGBA1C 6.00 (H) 10/10/2024 " "    Lab Results   Component Value Date    TSH 1.390 10/10/2024    FREET4 1.23 10/09/2024     No results found for: \"PREGTESTUR\", \"PREGSERUM\", \"HCG\", \"HCGQUANT\"  Pain Management Panel          Latest Ref Rng & Units 4/20/2016   Pain Management Panel   Amphetamine, Urine Qual NEGATIVE Negative    Barbiturates Screen, Urine NEGATIVE Negative    Benzodiazepine Screen, Urine NEGATIVE  NEGATIVE PRESUMPTIVE POSITIVE  PRESUMPTIVE POSITIVE    Cocaine Screen, Urine NEGATIVE Negative    Methadone Screen , Urine NEGATIVE Negative       Details          Multiple values from one day are sorted in reverse-chronological order             Blood Culture   Date Value Ref Range Status   10/09/2024 No growth at 24 hours  Preliminary   10/09/2024 No growth at 24 hours  Preliminary     No results found for: \"URINECX\"  No results found for: \"WOUNDCX\"  No results found for: \"STOOLCX\"      ---------------------------------------------------------------------------------------------------------------------   Imaging Results (Last 7 Days)       Procedure Component Value Units Date/Time    MRI Foot Left With & Without Contrast [[id number]] Collected: 10/10/24 1035     Updated: 10/10/24 1123    Narrative:      MR LEFT FOOT, WITHOUT AND WITH GADOLINIUM ENHANCEMENT.     COMPARISON: 04/14/2017     TECHNIQUE: Multiplanar MR, without and with gadolinium enhancement.     FINDINGS: There is abnormal marrow signal with marrow replacement,  enhancement and bone destruction centered at the first MTP joint.  Destructive changes are more pronounced of the first metatarsal head  although there is destruction also noted involving the base of the first  proximal phalanx. Features are most suggestive of septic arthritis with  osteomyelitis. There are also similar but less pronounced changes at the  first interphalangeal joint also compatible with septic arthritis and  osteomyelitis.     There is thickening of the soft tissues without significant " enhancement  surrounding the fifth metatarsal head. This is probably scar tissue  given lack of significant enhancement. There are no abnormal marrow  signal changes evident.     New cystic changes are seen of the second and third metatarsal heads  which may be arthritic in nature.       Impression:      Osteomyelitis and septic arthritis involving most  significantly the distal first metatarsal and MTP joint but also to a  lesser extent the first interphalangeal joint with adjacent phalanges.     This report was signed and finalized on 10/10/2024 11:21 AM by Jc Casey MD.             ----------------------------------------------------------------------------------------------------------------------  Assessment and Plan:  39 year old male with recent history of osteomyelitis to the left 1st MTPJ and hallux IPJ, completed six weeks IV Vancomycin 3-4 weeks ago    -Xray and MRI reviewed: no comparisons available to [facility] from initial or follow up osteomyelitis treatments.     There are significant erosions at the effected joints, although without comparisons cannot tell if there is worsening since osteomyelitis treatment. There are no open wounds associated with the left foot and minimal erythema. I do believe the recent increase in swelling in pain is associated with anatomical changes and early dislocation of the 1st MTPJ.    CRP and ESR ordered to trend against previous results during osteomyelitis treatments.    No surgery planned at this time. Foot needs to be monitored closely with serial radiographs and continued trending of ESR and CRP. Out of abundance of caution, patient can be discharged on PO antibiotics based on previous culture results for 7-10 days.    Thank you for the consult.  Patient can follow up as an outpatient in two weeks. [facility] [place] [phone].      Yudelka De La Torre DPM  10/10/24  16:59 EDT

## 2024-10-10 NOTE — PAYOR COMM NOTE
"To:  [employer]  From: Ashleigh Harrington RN  Phone: [phone]  Fax: [phone]  NPI: [id number]  TIN: [id number]  Member ID: [id number]   MRN: [id number]    J Luis Elizabeth (39 y.o. Male)       Date of Birth   1985    Social Security Number       Address   [address] [address]    Home Phone   [phone]    MRN   [id number]       Rastafarian   None    Marital Status                               Admission Date   10/9/24    Admission Type   Elective    Admitting Provider   Marquez Logan MD    Attending Provider   Marquez Logan MD    Department, Room/Bed   [facility] TELEMETRY 3, 307/1       Discharge Date       Discharge Disposition       Discharge Destination                                 Attending Provider: Marquez Logan MD    Allergies: Penicillins, Vancomycin    Isolation: None   Infection: None   Code Status: CPR    Ht: 198.1 cm (78\")   Wt: 153 kg (337 lb 1.3 oz)    Admission Cmt: None   Principal Problem: Foot osteomyelitis, left [M86.9]                   Active Insurance as of 10/9/2024       Primary Coverage       Payor Plan Insurance Group Employer/Plan Group    [employer] [place] [employer]        Payor Plan Address Payor Plan Phone Number Payor Plan Fax Number Effective Dates    [address] [address] [phone]  10/1/2024 - None Entered    [facility] [address]         Subscriber Name Subscriber Birth Date Member ID       J LUIS ELIZABETH 1985 [id number]                     Emergency Contacts        (Rel.) Home Phone Work Phone Mobile Phone    Uzma Elizabeth (Mother) [phone] -- --              History & Physical    No notes of this type exist for this encounter.       Vital Signs (last day)       Date/Time Temp Temp src Pulse Resp BP Patient Position SpO2    10/10/24 0659 98 (36.7) Oral 95 18 121/85 Sitting 93    10/10/24 0337 -- -- 83 18 108/75 Lying 93    10/10/24 0022 -- -- 80 18 125/83 Lying 98    10/09/24 1922 " 98.2 (36.8) Oral 88 20 134/88 Lying 97    10/09/24 1549 98.4 (36.9) Oral 77 18 144/96 Lying 100    10/09/24 1153 97.9 (36.6) Oral 89 18 140/94 Lying 99          Current Facility-Administered Medications   Medication Dose Route Frequency Provider Last Rate Last Admin    apixaban (ELIQUIS) tablet 5 mg  5 mg Oral Q12H Marquez Logan MD   5 mg at 10/10/24 0828    sennosides-docusate (PERICOLACE) 8.6-50 MG per tablet 2 tablet  2 tablet Oral BID PRN Marquez Logan MD        And    polyethylene glycol (MIRALAX) packet 17 g  17 g Oral Daily PRN Marquez Logan MD        And    bisacodyl (DULCOLAX) EC tablet 5 mg  5 mg Oral Daily PRN Marquez Logan MD        And    bisacodyl (DULCOLAX) suppository 10 mg  10 mg Rectal Daily PRN Marquez Logan MD        Calcium Replacement - Follow Nurse / BPA Driven Protocol   Does not apply PRMarquez Shields MD        citalopram (CeleXA) tablet 40 mg  40 mg Oral Daily Marquez Logan MD   40 mg at 10/10/24 0828    gabapentin (NEURONTIN) capsule 800 mg  800 mg Oral Q12H Marquez Logan MD   800 mg at 10/10/24 0828    ibuprofen (ADVIL,MOTRIN) tablet 200 mg  200 mg Oral Q4H PRN Marquez Logan MD        lamoTRIgine (LaMICtal) tablet 25 mg  25 mg Oral Q12H Marquez Logan MD   25 mg at 10/10/24 0949    LORazepam (ATIVAN) tablet 0.5 mg  0.5 mg Oral Q6H PRN Marquez Logan MD   0.5 mg at 10/10/24 0828    Magnesium Standard Dose Replacement - Follow Nurse / BPA Driven Protocol   Does not apply Marquez Willis MD        nicotine (NICODERM CQ) 21 MG/24HR patch 1 patch  1 patch Transdermal Q24H Augusto Young MD   1 patch at 10/10/24 0037    nicotine polacrilex (NICORETTE) gum 4 mg  4 mg Mouth/Throat Q1H PRN Marquez Logan MD   4 mg at 10/10/24 0023    ondansetron (ZOFRAN) injection 4 mg  4 mg Intravenous Q6H PRN Marquez Logan MD        Pharmacy to dose vancomycin   Does not apply Daily Marquez Logan MD        Phosphorus Replacement - Follow Nurse / BPA Driven Protocol    Does not apply PRMarquez Shields MD        Potassium Replacement - Follow Nurse / BPA Driven Protocol   Does not apply PRN Marquez Logan MD        sodium chloride 0.9 % flush 10 mL  10 mL Intravenous Q12H Marquez Logan MD   10 mL at 10/10/24 0830    sodium chloride 0.9 % flush 10 mL  10 mL Intravenous PRN Marquez Logan MD        sodium chloride 0.9 % flush 10 mL  10 mL Intravenous Q12H Marquez Logan MD   10 mL at 10/10/24 0830    sodium chloride 0.9 % flush 10 mL  10 mL Intravenous PRN Marquez Logan MD        sodium chloride 0.9 % flush 20 mL  20 mL Intravenous LESLIEN Marquez Logan MD        sodium chloride 0.9 % infusion 40 mL  40 mL Intravenous LESLIEN Marquez Logan MD        [Held by provider] vancomycin (VANCOCIN) 1,500 mg in sodium chloride 0.9 % 500 mL IVPB  1,500 mg Intravenous Q12H Marquez Logan .3 mL/hr at 10/09/24 2355 1,500 mg at 10/09/24 2355     Lab Results (last 24 hours)       Procedure Component Value Units Date/Time    MRSA Screen, PCR (Inpatient) - Swab, Nares [[id number]] Collected: 10/10/24 0654    Specimen: Swab from Nares Updated: 10/10/24 0748    TSH [[id number]]  (Normal) Collected: 10/10/24 0544    Specimen: Blood Updated: 10/10/24 0644     TSH 1.390 uIU/mL     Comprehensive Metabolic Panel [[id number]]  (Abnormal) Collected: 10/10/24 0544    Specimen: Blood Updated: 10/10/24 0641     Glucose 125 mg/dL      BUN 12 mg/dL      Creatinine 1.03 mg/dL      Sodium 137 mmol/L      Potassium 4.8 mmol/L      Chloride 104 mmol/L      CO2 22.9 mmol/L      Calcium 8.9 mg/dL      Total Protein 7.5 g/dL      Albumin 3.8 g/dL      ALT (SGPT) 14 U/L      AST (SGOT) 18 U/L      Alkaline Phosphatase 78 U/L      Total Bilirubin 0.2 mg/dL      Globulin 3.7 gm/dL      A/G Ratio 1.0 g/dL      BUN/Creatinine Ratio 11.7     Anion Gap 10.1 mmol/L      eGFR 93.6 mL/min/1.73     Narrative:      GFR Normal >60  Chronic Kidney Disease <60  Kidney Failure <15      CBC (No Diff) [[id number]]   (Normal) Collected: 10/10/24 0544    Specimen: Blood Updated: 10/10/24 0629     WBC 7.56 10*3/mm3      RBC 4.87 10*6/mm3      Hemoglobin 14.0 g/dL      Hematocrit 41.7 %      MCV 85.6 fL      MCH 28.7 pg      MCHC 33.6 g/dL      RDW 14.2 %      RDW-SD 44.6 fl      MPV 9.1 fL      Platelets 225 10*3/mm3     Hemoglobin A1c [[id number]]  (Abnormal) Collected: 10/10/24 0544    Specimen: Blood Updated: 10/10/24 0617     Hemoglobin A1C 6.00 %     Narrative:      Hemoglobin A1C Ranges:    Increased Risk for Diabetes  5.7% to 6.4%  Diabetes                     >= 6.5%  Diabetic Goal                < 7.0%    T4, Free [[id number]]  (Normal) Collected: 10/09/24 1344    Specimen: Blood Updated: 10/09/24 1848     Free T4 1.23 ng/dL     Comprehensive Metabolic Panel [[id number]]  (Abnormal) Collected: 10/09/24 1344    Specimen: Blood Updated: 10/09/24 1416     Glucose 129 mg/dL      BUN 10 mg/dL      Creatinine 1.02 mg/dL      Sodium 135 mmol/L      Potassium 4.4 mmol/L      Comment: Specimen hemolyzed.  Result may be falsely elevated.        Chloride 102 mmol/L      CO2 20.5 mmol/L      Calcium 8.7 mg/dL      Total Protein 7.5 g/dL      Albumin 3.7 g/dL      ALT (SGPT) 17 U/L      Comment: Specimen hemolyzed.  Result may  be falsely elevated.        AST (SGOT) 25 U/L      Comment: Specimen hemolyzed.  Result may be falsely elevated.        Alkaline Phosphatase 88 U/L      Total Bilirubin <0.2 mg/dL      Globulin 3.8 gm/dL      A/G Ratio 1.0 g/dL      BUN/Creatinine Ratio 9.8     Anion Gap 12.5 mmol/L      eGFR 94.7 mL/min/1.73     Narrative:      GFR Normal >60  Chronic Kidney Disease <60  Kidney Failure <15      CBC Auto Differential [[id number]]  (Abnormal) Collected: 10/09/24 1343    Specimen: Blood Updated: 10/09/24 1359     WBC 7.16 10*3/mm3      RBC 4.66 10*6/mm3      Hemoglobin 13.6 g/dL      Hematocrit 41.8 %      MCV 89.7 fL      MCH 29.2 pg      MCHC 32.5 g/dL      RDW 14.3 %      RDW-SD 46.5 fl      MPV 9.6 fL       Platelets 222 10*3/mm3      Neutrophil % 57.6 %      Lymphocyte % 27.7 %      Monocyte % 9.1 %      Eosinophil % 4.7 %      Basophil % 0.3 %      Immature Grans % 0.6 %      Neutrophils, Absolute 4.13 10*3/mm3      Lymphocytes, Absolute 1.98 10*3/mm3      Monocytes, Absolute 0.65 10*3/mm3      Eosinophils, Absolute 0.34 10*3/mm3      Basophils, Absolute 0.02 10*3/mm3      Immature Grans, Absolute 0.04 10*3/mm3      nRBC 0.0 /100 WBC     Blood Culture - Blood, Wrist, Right [[id number]] Collected: 10/09/24 1346    Specimen: Blood from Wrist, Right Updated: 10/09/24 1350    Blood Culture - Blood, Wrist, Left [[id number]] Collected: 10/09/24 1344    Specimen: Blood from Wrist, Left Updated: 10/09/24 1348          Imaging Results (Last 24 Hours)       Procedure Component Value Units Date/Time    MRI Foot Left With & Without Contrast [[id number]] Collected: 10/10/24 1035     Updated: 10/10/24 1035    Narrative:      MR LEFT FOOT, WITHOUT AND WITH GADOLINIUM ENHANCEMENT.     COMPARISON: 04/14/2017     TECHNIQUE: Multiplanar MR, without and with gadolinium enhancement.     FINDINGS: There is abnormal marrow signal with marrow replacement,  enhancement and bone destruction centered at the first MTP joint.  Destructive changes are more pronounced of the first metatarsal head  although there is destruction also noted involving the base of the first  proximal phalanx. Features are most suggestive of septic arthritis with  osteomyelitis. There are also similar but less pronounced changes at the  first interphalangeal joint also compatible with septic arthritis and  osteomyelitis.     There is thickening of the soft tissues without significant enhancement  surrounding the fifth metatarsal head. This is probably scar tissue  given lack of significant enhancement. There are no abnormal marrow  signal changes evident.     New cystic changes are seen of the second and third metatarsal heads  which may be arthritic in nature.        Impression:      Osteomyelitis and septic arthritis involving most  significantly the distal first metatarsal and MTP joint but also to a  lesser extent the first interphalangeal joint with adjacent phalanges.             Orders (last 24 hrs)        Start     Ordered    10/16/24 0000  PICC Site Care  Weekly      Comments: Dressing Changes to PICC Site Every 7 Days and As Needed    10/09/24 1538    10/11/24 0600  Vancomycin, Random  Morning Draw         10/09/24 1520    10/10/24 1015  lamoTRIgine (LaMICtal) tablet 25 mg  Every 12 Hours Scheduled         10/10/24 0923    10/10/24 0605  MRSA Screen, PCR (Inpatient) - Swab, Nares  Once         10/10/24 0604    10/10/24 0600  Basic Metabolic Panel  Daily       10/09/24 1520    10/10/24 0600  Daily CHG Bath While Central Line in Place  Daily       10/09/24 1538    10/10/24 0600  CBC (No Diff)  Morning Draw         10/09/24 1818    10/10/24 0600  Comprehensive Metabolic Panel  Morning Draw         10/09/24 1818    10/10/24 0600  Hemoglobin A1c  Morning Draw         10/09/24 1818    10/10/24 0600  TSH  Morning Draw         10/09/24 1818    10/10/24 0400  methylPREDNISolone sodium succinate (SOLU-Medrol) injection 80 mg  Once         10/10/24 0305    10/10/24 0400  diphenhydrAMINE (BENADRYL) capsule 25 mg  Once         10/10/24 0305    10/10/24 0115  nicotine (NICODERM CQ) 21 MG/24HR patch 1 patch  Every 24 Hours Scheduled         10/10/24 0020    10/10/24 0000  vancomycin (VANCOCIN) 1,500 mg in sodium chloride 0.9 % 500 mL IVPB  Once,   Status:  Discontinued         10/09/24 1520    10/10/24 0000  [Held by provider]  vancomycin (VANCOCIN) 1,500 mg in sodium chloride 0.9 % 500 mL IVPB  Every 12 Hours        (On hold since today at 0304 until manually unheld; held by Augusto Young MDHold Reason: Other (Comment Required)Hold Comments: Hold per MD)    10/09/24 1521    10/09/24 2100  sodium chloride 0.9 % flush 10 mL  Every 12 Hours Scheduled         10/09/24 1538    10/09/24  2100  apixaban (ELIQUIS) tablet 5 mg  Every 12 Hours Scheduled         10/09/24 1818    10/09/24 2100  gabapentin (NEURONTIN) capsule 800 mg  Every 12 Hours Scheduled         10/09/24 1818    10/09/24 1915  citalopram (CeleXA) tablet 40 mg  Daily         10/09/24 1818    10/09/24 1815  gadobenate dimeglumine (MULTIHANCE) injection 30 mL  Once in Imaging         10/09/24 1719    10/09/24 1800  Oral Care  2 Times Daily       10/09/24 1252    10/09/24 1753  Inpatient Admission  Once         10/09/24 1818    10/09/24 1753  T4, Free  Once         10/09/24 1818    10/09/24 1701  MRI Foot Left With & Without Contrast  1 Time Imaging         10/09/24 1252    10/09/24 1600  Vital Signs  Every 4 Hours       10/09/24 1252    10/09/24 1600  vancomycin 2500 mg/500 mL 0.9% NS IVPB ([facility])  Once         10/09/24 1458    10/09/24 1539  Do NOT Draw From PICC  Continuous         10/09/24 1538    10/09/24 1539  May Use PICC for Power Injected CT  Continuous         10/09/24 1538    10/09/24 1539  No Dilantin Through PICC  Continuous         10/09/24 1538    10/09/24 1539  Notify Provider if PICC is Occluded  Continuous        Comments: Open Order Report to View Parameters Requiring Provider Notification    10/09/24 1538    10/09/24 1539  Use 3CG Technology For Placement Verification (PICC Nurse)  Once         10/09/24 1538    10/09/24 1539  Change CHG Dressing or Transparent Dressing with CHG Disk, Needleless Connectors and Securement Device Every 7 Days  Weekly        Comments: Per [facility] Policy    10/09/24 1538    10/09/24 1538  sodium chloride 0.9 % flush 10 mL  As Needed         10/09/24 1538    10/09/24 1538  sodium chloride 0.9 % flush 20 mL  As Needed         10/09/24 1538    10/09/24 1538  Verify Informed Consent for PICC Line Placement  Once         10/09/24 1538    10/09/24 1538  Catheter Care PICC  Per Order Details        Comments: 1) Follow PICC Protocol For Care  2) No Blood Pressure in Arm With PICC  3) Warm Packs to PICC  "Arm As Needed For Discomfort  4) Measure & Record Arm Circumference if Patient Experiences Pain, Swelling, Redness or Warmth in PICC Arm; Compare Measurement to Initial Measure, Report to Provider if Greater Than 3cm Difference  5) Follow Flush Protocol Per Facility    10/09/24 1538    10/09/24 1538  Ensure Needleless Connectors are In Place  Per Order Details        Comments: Change Needleless Connectors Per [facility] Policy    10/09/24 1538    10/09/24 1538  No Venipuncture or BP in PICC Arm  Continuous        Comments: Right arm    10/09/24 1538    10/09/24 1538  For Sluggish / Occluded Line, Use CATHFLO Activase Per Policy (Per IV Nurse Only)  Once        Comments: Per Facility Policy    10/09/24 1538    10/09/24 1538  Ensure PICC Securing Device is in Use  Continuous         10/09/24 1538    10/09/24 1500  vancomycin 2500 mg/500 mL 0.9% NS IVPB ([facility])  Once,   Status:  Discontinued         10/09/24 1342    10/09/24 1345  sodium chloride 0.9 % flush 10 mL  Every 12 Hours Scheduled         10/09/24 1252    10/09/24 1345  nicotine (NICODERM CQ) 21 MG/24HR patch 1 patch  Every 24 Hours,   Status:  Discontinued         10/09/24 1252    10/09/24 1345  vancomycin 2500 mg/500 mL 0.9% NS IVPB ([facility])  Once,   Status:  Discontinued         10/09/24 1254    10/09/24 1330  Pharmacy to dose vancomycin  Daily         10/09/24 1252    10/09/24 1314  CBC Auto Differential  STAT         10/09/24 1252    10/09/24 1314  Comprehensive Metabolic Panel  STAT         10/09/24 1252    10/09/24 1314  Blood Culture - Blood, Wrist, Right  Once        Placed in \"And\" Linked Group    10/09/24 1252    10/09/24 1314  Blood Culture - Blood, Wrist, Left  Once        Comments: From Different Site Than 1st Blood Culture     Placed in \"And\" Linked Group    10/09/24 1252    10/09/24 1249  ibuprofen (ADVIL,MOTRIN) tablet 200 mg  Every 4 Hours PRN         10/09/24 1252    10/09/24 1248  nicotine polacrilex (NICORETTE) gum 4 mg  Every 1 Hour PRN         " "10/09/24 1252    10/09/24 1247  ondansetron (ZOFRAN) injection 4 mg  Every 6 Hours PRN         10/09/24 1252    10/09/24 1244  LORazepam (ATIVAN) tablet 0.5 mg  Every 6 Hours PRN         10/09/24 1252    10/09/24 1243  sennosides-docusate (PERICOLACE) 8.6-50 MG per tablet 2 tablet  2 Times Daily PRN        Placed in \"And\" Linked Group    10/09/24 1252    10/09/24 1243  polyethylene glycol (MIRALAX) packet 17 g  Daily PRN        Placed in \"And\" Linked Group    10/09/24 1252    10/09/24 1243  bisacodyl (DULCOLAX) EC tablet 5 mg  Daily PRN        Placed in \"And\" Linked Group    10/09/24 1252    10/09/24 1243  bisacodyl (DULCOLAX) suppository 10 mg  Daily PRN        Placed in \"And\" Linked Group    10/09/24 1252    10/09/24 1243  Potassium Replacement - Follow Nurse / BPA Driven Protocol  As Needed         10/09/24 1252    10/09/24 1243  Magnesium Standard Dose Replacement - Follow Nurse / BPA Driven Protocol  As Needed         10/09/24 1252    10/09/24 1243  Phosphorus Replacement - Follow Nurse / BPA Driven Protocol  As Needed         10/09/24 1252    10/09/24 1243  Calcium Replacement - Follow Nurse / BPA Driven Protocol  As Needed         10/09/24 1252    10/09/24 1243  MRI Foot Left With Contrast  1 Time Imaging,   Status:  Canceled         10/09/24 1252    10/09/24 1241  Inpatient Podiatry Consult  Once        Specialty:  Podiatry  Provider:  Yudelka De La Torre DPM    10/09/24 1252    10/09/24 1234  Activity - Ad Violeta  Until Discontinued         10/09/24 1252    10/09/24 1234  Notify Provider (With Default Parameters)  Continuous        Comments: Open Order Report to View Parameters Requiring Provider Notification    10/09/24 1252    10/09/24 1233  Intake & Output  Every Shift       10/09/24 1252    10/09/24 1233  Weigh Patient  Once         10/09/24 1252    10/09/24 1233  Insert Peripheral IV  Once         10/09/24 1252    10/09/24 1233  Saline Lock & Maintain IV Access  Continuous         10/09/24 1252    10/09/24 " 1233  Inpatient Admission  Once         10/09/24 1252    10/09/24 1233  Code Status and Medical Interventions: CPR (Attempt to Resuscitate); Full Support  Continuous         10/09/24 1252    10/09/24 1232  sodium chloride 0.9 % flush 10 mL  As Needed         10/09/24 1252    10/09/24 1232  sodium chloride 0.9 % infusion 40 mL  As Needed         10/09/24 1252    10/09/24 1156  Inpatient PICC Consult  Once        Provider:  (Not yet assigned)    10/09/24 1156    10/09/24 1155  Diet: Diabetic; Consistent Carbohydrate; Fluid Consistency: Thin (IDDSI 0)  Diet Effective Now         10/09/24 1156    10/02/24 0000  sulfamethoxazole-trimethoprim (BACTRIM DS,SEPTRA DS) 800-160 MG per tablet  2 Times Daily         10/09/24 1208    Unscheduled  Remove PICC Cap for CT  As Needed       10/09/24 1538    --  gabapentin (NEURONTIN) 800 MG tablet  2 Times Daily         10/09/24 1202    --  citalopram (CeleXA) 40 MG tablet  Daily         10/09/24 1208    --  apixaban (ELIQUIS) 5 MG tablet tablet  2 Times Daily         10/09/24 1208    --  NON FORMULARY  Every 30 Days         10/09/24 1305                     Physician Progress Notes (most recent note)        Marquez Logan MD at 10/10/24 0918                [facility]  INTERNAL MEDICINE PROGRESS NOTE    Name:  J Luis Estrada   Age:  39 y.o.  Sex:  male  :  1985  MRN:  [id number]   Visit Number:  [id number]  Admission Date:  10/9/2024  Date Of Service:  10/10/24  Primary Care Physician:  Marquez Logan MD     LOS: 1 day :  Patient Care Team:  Marquez Logan MD as PCP - General (Internal Medicine):      Subjective / Interval History:     39-year-old patient with history of bipolar depression and anxiety disorder who is prediabetic has been having worsening of the left foot pain swelling and osteomyelitis of the metatarsal for which he has failed outpatient management and is being admitted for PICC line placement with IV antibiotics and further  surgical evaluation.    His PICC line has been placed yesterday and he has been started on vancomycin after cultures.  He is having a lot of swelling of the foot and difficulty in walking with pain.  Podiatry \ Ortho consult still pending      Vital Signs:    Temp:  [97.9 °F (36.6 °C)-98.4 °F (36.9 °C)] 98 °F (36.7 °C)  Heart Rate:  [77-95] 95  Resp:  [18-20] 18  BP: (108-144)/(75-96) 121/85    Intake and output:    I/O last 3 completed shifts:  In: 2400 [P.O.:2400]  Out: -   I/O this shift:  In: 240 [P.O.:240]  Out: -     Physical Examination:    General Appearance:    Alert and cooperative, not in any acute distress.   Head:    Atraumatic and normocephalic, without obvious abnormality.   Eyes:            PERRLA,  No pallor. Extraocular movements are within normal limits.   Neck:   Supple,  No lymph glands, no bruit.   Lungs:     Chest shape is normal. Breath sounds heard bilaterally equally.  No crackles or wheezing.     Heart:    Normal S1 and S2, no murmur, no JVD.   Abdomen:     Normal bowel sounds, no masses, no organomegaly. Soft     nontender, no guarding, no rebound tenderness.   Extremities:   Moves all extremities well, left foot swelling tenderness over the metatarsal area over the greater, diffuse foot edema, no cyanosis,    Skin:   No  bruising or rash.   Neurologic:   Grossly nonfocal and moves all extremities.      Laboratory results:  Results from last 7 days   Lab Units 10/10/24  0544 10/09/24  1344   SODIUM mmol/L 137 135*   POTASSIUM mmol/L 4.8 4.4   CHLORIDE mmol/L 104 102   CO2 mmol/L 22.9 20.5*   BUN mg/dL 12 10   CREATININE mg/dL 1.03 1.02   CALCIUM mg/dL 8.9 8.7   BILIRUBIN mg/dL 0.2 <0.2   ALK PHOS U/L 78 88   ALT (SGPT) U/L 14 17   AST (SGOT) U/L 18 25   GLUCOSE mg/dL 125* 129*     Results from last 7 days   Lab Units 10/10/24  0544 10/09/24  1343   WBC 10*3/mm3 7.56 7.16   HEMOGLOBIN g/dL 14.0 13.6   HEMATOCRIT % 41.7 41.8   PLATELETS 10*3/mm3 225 222                   Radiology  results:    Imaging Results (Last 24 Hours)       Procedure Component Value Units Date/Time    MRI Foot Left With & Without Contrast [[id number]] Resulted: 10/09/24 1701     Updated: 10/09/24 1720            I have reviewed the patient's radiology reports.    Medication Review:     I have reviewed the patient's active and prn medications.     Assessment:      Foot osteomyelitis, left    Osteomyelitis of ankle or foot, acute  Bipolar depression  Anxiety disorder        Plan:    Left foot osteomyelitis-she has failed conservative treatment in the past she has had 4 weeks of vancomycin it had improved and worsening symptoms have started again  Discussed with him plan of action to do 8 weeks of IV vancomycin  MRI of the foot has been repeated and result pending  Surgical consult to be done and pending evaluation    Bipolar disease and depression-will continue his home medications and restart Lamictal which he had been on but not taking it recently    Goals of treatment plan of care has been explained to the patient in details    Marquez Logan MD  10/10/24  09:18 EDT      Please note that portions of this note were completed with a voice recognition program.        Electronically signed by Marquez Logan MD at 10/10/24 0922       Consult Notes (most recent note)    No notes of this type exist for this encounter.

## 2024-10-10 NOTE — PLAN OF CARE
Problem: Adult Inpatient Plan of Care  Goal: Plan of Care Review  Outcome: Progressing  Goal: Patient-Specific Goal (Individualized)  Outcome: Progressing  Goal: Absence of Hospital-Acquired Illness or Injury  Outcome: Progressing  Intervention: Identify and Manage Fall Risk  Recent Flowsheet Documentation  Taken 10/9/2024 2200 by Farzana Martinez RN  Safety Promotion/Fall Prevention: safety round/check completed  Taken 10/9/2024 2000 by Farzana Martinez RN  Safety Promotion/Fall Prevention: safety round/check completed  Intervention: Prevent Skin Injury  Recent Flowsheet Documentation  Taken 10/9/2024 2200 by Farzana Martinez RN  Body Position: sitting up in bed  Taken 10/9/2024 2000 by Farzana Martinez RN  Body Position: dangle, side of bed  Intervention: Prevent and Manage VTE (Venous Thromboembolism) Risk  Recent Flowsheet Documentation  Taken 10/9/2024 2200 by Farzana Martinez RN  Activity Management: up ad jason  Taken 10/9/2024 2000 by Farzana Martinez RN  Activity Management: up ad jason  Goal: Optimal Comfort and Wellbeing  Outcome: Progressing  Goal: Readiness for Transition of Care  Outcome: Progressing     Problem: Activity and Energy Impairment (Anxiety Signs/Symptoms)  Goal: Optimized Energy Level (Anxiety Signs/Symptoms)  Outcome: Progressing     Problem: Cognitive Impairment (Anxiety Signs/Symptoms)  Goal: Optimized Cognitive Function (Anxiety Signs/Symptoms)  Outcome: Progressing     Problem: Mood Impairment (Anxiety Signs/Symptoms)  Goal: Improved Mood Symptoms (Anxiety Signs/Symptoms)  Outcome: Progressing     Problem: Sleep Impairment (Anxiety Signs/Symptoms)  Goal: Improved Sleep (Anxiety Signs/Symptoms)  Outcome: Progressing     Problem: Social, Occupational or Functional Impairment (Anxiety Signs/Symptoms)  Goal: Enhanced Social, Occupational or Functional Skills (Anxiety Signs/Symptoms)  Outcome: Progressing     Problem: Somatic Disturbance (Anxiety Signs/Symptoms)  Goal: Improved Somatic Symptoms  (Anxiety Signs/Symptoms)  Outcome: Progressing     Problem: Infection  Goal: Absence of Infection Signs and Symptoms  Outcome: Progressing   Goal Outcome Evaluation:

## 2024-10-10 NOTE — PLAN OF CARE
Problem: Adult Inpatient Plan of Care  Goal: Plan of Care Review  10/10/2024 0250 by Farzana Martniez RN  Outcome: Progressing  10/9/2024 2307 by Farzana Martinez RN  Outcome: Progressing  Goal: Patient-Specific Goal (Individualized)  10/10/2024 0250 by Farzana Martinez RN  Outcome: Progressing  10/9/2024 2307 by Farzana Martinez RN  Outcome: Progressing  Goal: Absence of Hospital-Acquired Illness or Injury  10/10/2024 0250 by Farzana Martinez RN  Outcome: Progressing  10/9/2024 2307 by Farzana Martinez RN  Outcome: Progressing  Intervention: Identify and Manage Fall Risk  Recent Flowsheet Documentation  Taken 10/10/2024 0200 by Farzana Martinez RN  Safety Promotion/Fall Prevention: safety round/check completed  Taken 10/10/2024 0000 by Farzana Martinez RN  Safety Promotion/Fall Prevention: safety round/check completed  Taken 10/9/2024 2200 by Farzana Martinez RN  Safety Promotion/Fall Prevention: safety round/check completed  Taken 10/9/2024 2000 by Farzana Martinez RN  Safety Promotion/Fall Prevention: safety round/check completed  Intervention: Prevent Skin Injury  Recent Flowsheet Documentation  Taken 10/10/2024 0200 by Farzana Martinez RN  Body Position: dangle, side of bed  Taken 10/10/2024 0000 by Farzana Martinez RN  Body Position: supine, legs elevated  Taken 10/9/2024 2200 by Farzana Martinez RN  Body Position: sitting up in bed  Taken 10/9/2024 2000 by Farzana Martinez RN  Body Position: dangle, side of bed  Intervention: Prevent and Manage VTE (Venous Thromboembolism) Risk  Recent Flowsheet Documentation  Taken 10/10/2024 0200 by Farzana Martinez RN  Activity Management: up ad jason  Taken 10/10/2024 0000 by Farzana Martinez RN  Activity Management: up ad jason  Taken 10/9/2024 2200 by Farzana Martinez RN  Activity Management: up ad jason  Taken 10/9/2024 2000 by Farzana Martinez RN  Activity Management: up ad jason  Goal: Optimal Comfort and Wellbeing  10/10/2024 0250 by Juan, Farzana, RN  Outcome: Progressing  10/9/2024 8028  by Juan, Farzana, RN  Outcome: Progressing  Goal: Readiness for Transition of Care  10/10/2024 0250 by Farzana Martinez RN  Outcome: Progressing  10/9/2024 2307 by Farzana Martinez RN  Outcome: Progressing     Problem: Activity and Energy Impairment (Anxiety Signs/Symptoms)  Goal: Optimized Energy Level (Anxiety Signs/Symptoms)  10/10/2024 0250 by Farzana Martinez RN  Outcome: Progressing  10/9/2024 2307 by Farzana Martinez RN  Outcome: Progressing     Problem: Cognitive Impairment (Anxiety Signs/Symptoms)  Goal: Optimized Cognitive Function (Anxiety Signs/Symptoms)  10/10/2024 0250 by Farzana Martinez RN  Outcome: Progressing  10/9/2024 2307 by Farzana Martinez RN  Outcome: Progressing     Problem: Mood Impairment (Anxiety Signs/Symptoms)  Goal: Improved Mood Symptoms (Anxiety Signs/Symptoms)  10/10/2024 0250 by Farzana Martinez RN  Outcome: Progressing  10/9/2024 2307 by Farzana Martinez RN  Outcome: Progressing     Problem: Sleep Impairment (Anxiety Signs/Symptoms)  Goal: Improved Sleep (Anxiety Signs/Symptoms)  10/10/2024 0250 by Farzana Martinez RN  Outcome: Progressing  10/9/2024 2307 by Farzana Martinez RN  Outcome: Progressing     Problem: Social, Occupational or Functional Impairment (Anxiety Signs/Symptoms)  Goal: Enhanced Social, Occupational or Functional Skills (Anxiety Signs/Symptoms)  10/10/2024 0250 by Farzana Martinez RN  Outcome: Progressing  10/9/2024 2307 by Farzana Martinez RN  Outcome: Progressing     Problem: Somatic Disturbance (Anxiety Signs/Symptoms)  Goal: Improved Somatic Symptoms (Anxiety Signs/Symptoms)  10/10/2024 0250 by Farzana Martinez RN  Outcome: Progressing  10/9/2024 2307 by Farzana Martinez RN  Outcome: Progressing     Problem: Infection  Goal: Absence of Infection Signs and Symptoms  10/10/2024 0250 by Farzana Martinez RN  Outcome: Progressing  10/9/2024 2307 by Farzana Martinez RN  Outcome: Progressing   Goal Outcome Evaluation:

## 2024-10-10 NOTE — PLAN OF CARE
Goal Outcome Evaluation:              Outcome Evaluation: Pts VSS, continues to have moderate to severe anxiety over possible osteo in foot, pt had no reactions to Vanc infusion this afternoon, dressing on PICC changed today, mother has been at the bedside this evening, no acute episodes throughout shift, will continue to monitor for changes

## 2024-10-10 NOTE — CASE MANAGEMENT/SOCIAL WORK
1430: CM delivered Resource Book to pt. DCP Home with possible IV antibiotics set up.PICC line in place.

## 2024-10-11 LAB
ANION GAP SERPL CALCULATED.3IONS-SCNC: 10.6 MMOL/L (ref 5–15)
BUN SERPL-MCNC: 15 MG/DL (ref 6–20)
BUN/CREAT SERPL: 16.9 (ref 7–25)
CALCIUM SPEC-SCNC: 10.1 MG/DL (ref 8.6–10.5)
CHLORIDE SERPL-SCNC: 104 MMOL/L (ref 98–107)
CO2 SERPL-SCNC: 24.4 MMOL/L (ref 22–29)
CREAT SERPL-MCNC: 0.89 MG/DL (ref 0.76–1.27)
EGFRCR SERPLBLD CKD-EPI 2021: 110.4 ML/MIN/1.73
ERYTHROCYTE [SEDIMENTATION RATE] IN BLOOD: 22 MM/HR (ref 0–15)
GLUCOSE SERPL-MCNC: 113 MG/DL (ref 65–99)
POTASSIUM SERPL-SCNC: 4.8 MMOL/L (ref 3.5–5.2)
SODIUM SERPL-SCNC: 139 MMOL/L (ref 136–145)
VANCOMYCIN SERPL-MCNC: 15.6 MCG/ML (ref 5–40)

## 2024-10-11 PROCEDURE — 80202 ASSAY OF VANCOMYCIN: CPT | Performed by: INTERNAL MEDICINE

## 2024-10-11 PROCEDURE — 85652 RBC SED RATE AUTOMATED: CPT | Performed by: PODIATRIST

## 2024-10-11 PROCEDURE — 80048 BASIC METABOLIC PNL TOTAL CA: CPT | Performed by: INTERNAL MEDICINE

## 2024-10-11 PROCEDURE — 25010000002 LEVOFLOXACIN PER 250 MG: Performed by: INTERNAL MEDICINE

## 2024-10-11 RX ORDER — LEVOFLOXACIN 5 MG/ML
750 INJECTION, SOLUTION INTRAVENOUS EVERY 24 HOURS
Status: DISCONTINUED | OUTPATIENT
Start: 2024-10-12 | End: 2024-10-12 | Stop reason: HOSPADM

## 2024-10-11 RX ORDER — LEVOFLOXACIN 5 MG/ML
750 INJECTION, SOLUTION INTRAVENOUS EVERY 24 HOURS
Status: DISCONTINUED | OUTPATIENT
Start: 2024-10-11 | End: 2024-10-11

## 2024-10-11 RX ADMIN — LORAZEPAM 0.5 MG: 0.5 TABLET ORAL at 16:27

## 2024-10-11 RX ADMIN — GABAPENTIN 800 MG: 400 CAPSULE ORAL at 20:19

## 2024-10-11 RX ADMIN — LORAZEPAM 0.5 MG: 0.5 TABLET ORAL at 08:21

## 2024-10-11 RX ADMIN — NICOTINE 1 PATCH: 21 PATCH TRANSDERMAL at 08:10

## 2024-10-11 RX ADMIN — LORAZEPAM 0.5 MG: 0.5 TABLET ORAL at 23:00

## 2024-10-11 RX ADMIN — LEVOFLOXACIN 750 MG: 5 INJECTION, SOLUTION INTRAVENOUS at 09:04

## 2024-10-11 RX ADMIN — NICOTINE POLACRILEX 4 MG: 2 GUM, CHEWING BUCCAL at 16:27

## 2024-10-11 RX ADMIN — NICOTINE POLACRILEX 4 MG: 2 GUM, CHEWING BUCCAL at 21:45

## 2024-10-11 RX ADMIN — Medication 10 ML: at 08:11

## 2024-10-11 RX ADMIN — CITALOPRAM HYDROBROMIDE 40 MG: 20 TABLET ORAL at 08:10

## 2024-10-11 RX ADMIN — GABAPENTIN 800 MG: 400 CAPSULE ORAL at 08:09

## 2024-10-11 RX ADMIN — APIXABAN 5 MG: 5 TABLET, FILM COATED ORAL at 08:10

## 2024-10-11 RX ADMIN — APIXABAN 5 MG: 5 TABLET, FILM COATED ORAL at 20:19

## 2024-10-11 RX ADMIN — LAMOTRIGINE 25 MG: 25 TABLET ORAL at 08:08

## 2024-10-11 RX ADMIN — Medication 10 ML: at 20:19

## 2024-10-11 NOTE — PLAN OF CARE
Problem: Adult Inpatient Plan of Care  Goal: Plan of Care Review  Outcome: Progressing  Goal: Patient-Specific Goal (Individualized)  Outcome: Progressing  Goal: Absence of Hospital-Acquired Illness or Injury  Outcome: Progressing  Intervention: Identify and Manage Fall Risk  Recent Flowsheet Documentation  Taken 10/10/2024 2200 by Farzana Martinez RN  Safety Promotion/Fall Prevention: safety round/check completed  Taken 10/10/2024 2000 by Farzana Martinez RN  Safety Promotion/Fall Prevention: safety round/check completed  Intervention: Prevent Skin Injury  Recent Flowsheet Documentation  Taken 10/10/2024 2200 by Farzana Martinez RN  Body Position: supine, legs elevated  Taken 10/10/2024 2000 by Farzana Martinez RN  Body Position: position changed independently  Intervention: Prevent and Manage VTE (Venous Thromboembolism) Risk  Recent Flowsheet Documentation  Taken 10/10/2024 2200 by Farzana Martinez RN  Activity Management: up ad jason  Taken 10/10/2024 2000 by Farzana Martinez RN  Activity Management: up ad jason  Goal: Optimal Comfort and Wellbeing  Outcome: Progressing  Goal: Readiness for Transition of Care  Outcome: Progressing     Problem: Activity and Energy Impairment (Anxiety Signs/Symptoms)  Goal: Optimized Energy Level (Anxiety Signs/Symptoms)  Outcome: Progressing     Problem: Cognitive Impairment (Anxiety Signs/Symptoms)  Goal: Optimized Cognitive Function (Anxiety Signs/Symptoms)  Outcome: Progressing     Problem: Mood Impairment (Anxiety Signs/Symptoms)  Goal: Improved Mood Symptoms (Anxiety Signs/Symptoms)  Outcome: Progressing     Problem: Sleep Impairment (Anxiety Signs/Symptoms)  Goal: Improved Sleep (Anxiety Signs/Symptoms)  Outcome: Progressing     Problem: Social, Occupational or Functional Impairment (Anxiety Signs/Symptoms)  Goal: Enhanced Social, Occupational or Functional Skills (Anxiety Signs/Symptoms)  Outcome: Progressing     Problem: Somatic Disturbance (Anxiety Signs/Symptoms)  Goal:  Improved Somatic Symptoms (Anxiety Signs/Symptoms)  Outcome: Progressing     Problem: Infection  Goal: Absence of Infection Signs and Symptoms  Outcome: Progressing     Problem: Fall Injury Risk  Goal: Absence of Fall and Fall-Related Injury  Outcome: Progressing  Intervention: Promote Injury-Free Environment  Recent Flowsheet Documentation  Taken 10/10/2024 2200 by Farzana Martinez, RN  Safety Promotion/Fall Prevention: safety round/check completed  Taken 10/10/2024 2000 by Farzana Martinez, RN  Safety Promotion/Fall Prevention: safety round/check completed   Goal Outcome Evaluation:

## 2024-10-11 NOTE — CASE MANAGEMENT/SOCIAL WORK
Case Management/Social Work    Patient Name:  J Luis Estrada  YOB: 1985  MRN: [id number]  Admit Date:  10/9/2024        YASMEEN spoke with Dr. Logan regarding needing an order for IV Levaquin as pt was recently switched from vanc. Provider states pt will need 2 weeks. Provider states he will update order soon. YASMEEN called and spoke with [facility] [phone] who states pt was previous with them but services ended in September. Pt received new PICC line on 10/9. They state they will need a new order faxed before they could accept for services and supply medication. SW also spoke with [facility] infusion/[facility] who states pt was seen in clinic in September for PICC dressing changes and labs weekly. Pt states mother was taught on teachings previously. SW unsure if this can be arranged prior to weekend, due to clinics being closed on weekend. SW following.     15:53 EDT SW spoke with Dr. Logan who states he will change to 7 days of Levaquin IV after dc and then change to oral. He wants to keep PICC line in longer incase he needs later. Pt will still need to arrange for OP picc dressing and flushing even after completes antibiotics. SW still waiting on updated order to fax to [facility] . YASMEEN following.     16:15 EDT SW received order from provider and sent med order to [facility] via fax [phone]. YASMEEN also spoke with [facility] OP infusion and scheduled pt for picc dressing and labs on 10/18 at 1:30pm. YASMEEN updated CM who will inform patient. CM to follow up with [facility] tomorrow regarding order and delivery.        Electronically signed by:  ETHAN Burk  10/11/24 15:26 EDT

## 2024-10-11 NOTE — PROGRESS NOTES
[facility]  INTERNAL MEDICINE PROGRESS NOTE    Name:  J Luis Estrada   Age:  39 y.o.  Sex:  male  :  1985  MRN:  [id number]   Visit Number:  [id number]  Admission Date:  10/9/2024  Date Of Service:  10/11/24  Primary Care Physician:  Marquez Logan MD     LOS: 2 days :  Patient Care Team:  Marquez Logan MD as PCP - General (Internal Medicine):      Subjective / Interval History:     39-year-old patient with history of bipolar depression and anxiety disorder who is prediabetic has been having worsening of the left foot pain swelling and osteomyelitis of the metatarsal for which he has failed outpatient management and is being admitted for PICC line placement with IV antibiotics and further surgical evaluation.    His PICC line has been placed yesterday and he has been started on vancomycin after cultures.  He is having a lot of swelling of the foot and difficulty in walking with pain.    Patient seen on .  His antibiotic has been switched over today from vancomycin to Levaquin as his MRSA screen has been negative.  Dr. De La Torre's consult has been seen and appreciated      Vital Signs:    Temp:  [97.8 °F (36.6 °C)-98 °F (36.7 °C)] 97.9 °F (36.6 °C)  Heart Rate:  [74-95] 74  Resp:  [18-20] 18  BP: (110-142)/(63-84) 110/67    Intake and output:    I/O last 3 completed shifts:  In: 2400 [P.O.:2400]  Out: -   I/O this shift:  In: 480 [P.O.:480]  Out: -     Physical Examination:    General Appearance:    Alert and cooperative, not in any acute distress.   Head:    Atraumatic and normocephalic, without obvious abnormality.   Eyes:            PERRLA,  No pallor. Extraocular movements are within normal limits.   Neck:   Supple,  No lymph glands, no bruit.   Lungs:     Chest shape is normal. Breath sounds heard bilaterally equally.  No crackles or wheezing.     Heart:    Normal S1 and S2, no murmur, no JVD.   Abdomen:     Normal bowel sounds, no masses, no organomegaly. Soft      nontender, no guarding, no rebound tenderness.   Extremities:   Moves all extremities well, left foot swelling tenderness over the metatarsal area over the greater, diffuse foot edema, no cyanosis,    Skin:   No  bruising or rash.   Neurologic:   Grossly nonfocal and moves all extremities.      Laboratory results:  Results from last 7 days   Lab Units 10/11/24  0610 10/10/24  0544 10/09/24  1344   SODIUM mmol/L 139 137 135*   POTASSIUM mmol/L 4.8 4.8 4.4   CHLORIDE mmol/L 104 104 102   CO2 mmol/L 24.4 22.9 20.5*   BUN mg/dL 15 12 10   CREATININE mg/dL 0.89 1.03 1.02   CALCIUM mg/dL 10.1 8.9 8.7   BILIRUBIN mg/dL  --  0.2 <0.2   ALK PHOS U/L  --  78 88   ALT (SGPT) U/L  --  14 17   AST (SGOT) U/L  --  18 25   GLUCOSE mg/dL 113* 125* 129*     Results from last 7 days   Lab Units 10/10/24  0544 10/09/24  1343   WBC 10*3/mm3 7.56 7.16   HEMOGLOBIN g/dL 14.0 13.6   HEMATOCRIT % 41.7 41.8   PLATELETS 10*3/mm3 225 222             Results from last 7 days   Lab Units 10/09/24  1346 10/09/24  1344   BLOODCX  No growth at 24 hours No growth at 24 hours       Radiology results:    Imaging Results (Last 24 Hours)       Procedure Component Value Units Date/Time    MRI Foot Left With & Without Contrast [[id number]] Collected: 10/10/24 1035     Updated: 10/10/24 1123    Narrative:      MR LEFT FOOT, WITHOUT AND WITH GADOLINIUM ENHANCEMENT.     COMPARISON: 04/14/2017     TECHNIQUE: Multiplanar MR, without and with gadolinium enhancement.     FINDINGS: There is abnormal marrow signal with marrow replacement,  enhancement and bone destruction centered at the first MTP joint.  Destructive changes are more pronounced of the first metatarsal head  although there is destruction also noted involving the base of the first  proximal phalanx. Features are most suggestive of septic arthritis with  osteomyelitis. There are also similar but less pronounced changes at the  first interphalangeal joint also compatible with septic arthritis  and  osteomyelitis.     There is thickening of the soft tissues without significant enhancement  surrounding the fifth metatarsal head. This is probably scar tissue  given lack of significant enhancement. There are no abnormal marrow  signal changes evident.     New cystic changes are seen of the second and third metatarsal heads  which may be arthritic in nature.       Impression:      Osteomyelitis and septic arthritis involving most  significantly the distal first metatarsal and MTP joint but also to a  lesser extent the first interphalangeal joint with adjacent phalanges.     This report was signed and finalized on 10/10/2024 11:21 AM by Jc Casey MD.               I have reviewed the patient's radiology reports.    Medication Review:     I have reviewed the patient's active and prn medications.     Assessment:      Foot osteomyelitis, left    Osteomyelitis of ankle or foot, acute  Bipolar depression  Anxiety disorder        Plan:    Left foot osteomyelitis-she has failed conservative treatment in the past and he has been switched over from vancomycin to Levaquin as the MRSA screen is negative  MRI of the foot has been repeated and result reviewed  Surgical consult to be done and Dr. De La Torre does not plan to do surgery at present  Bipolar disease and depression-will continue his home medications and restart Lamictal which he had been on but not taking it recently    Goals of treatment plan of care has been explained to the patient in details    Marquez Logan MD  10/11/24  09:24 EDT      Please note that portions of this note were completed with a voice recognition program.

## 2024-10-11 NOTE — PLAN OF CARE
Goal Outcome Evaluation:              Outcome Evaluation: Pt VSS, A&O x4, moderate to severe anxiety still present, started on Levoflaxcin antibiotic regimen to be continued throughout weekend 1x daily, no acute episodes throughout shift, will continue to monitor for changes

## 2024-10-11 NOTE — CASE MANAGEMENT/SOCIAL WORK
DCP; Home with family. Pt previously received home IV antibiotics. He stated he went to outpatient for weekly PICC dressing changes and his mother was taught how to give home IV's. SW updated. CM continues to follow.

## 2024-10-12 VITALS
DIASTOLIC BLOOD PRESSURE: 83 MMHG | OXYGEN SATURATION: 99 % | TEMPERATURE: 97.8 F | HEIGHT: 78 IN | BODY MASS INDEX: 36.45 KG/M2 | RESPIRATION RATE: 20 BRPM | HEART RATE: 76 BPM | SYSTOLIC BLOOD PRESSURE: 122 MMHG | WEIGHT: 315 LBS

## 2024-10-12 PROCEDURE — 25010000002 LEVOFLOXACIN PER 250 MG: Performed by: INTERNAL MEDICINE

## 2024-10-12 RX ORDER — LEVOFLOXACIN 5 MG/ML
INJECTION, SOLUTION INTRAVENOUS
Start: 2024-10-12

## 2024-10-12 RX ORDER — LEVOFLOXACIN 750 MG/1
750 TABLET, FILM COATED ORAL DAILY
Qty: 30 TABLET | Refills: 0 | Status: SHIPPED | OUTPATIENT
Start: 2024-10-12

## 2024-10-12 RX ORDER — LAMOTRIGINE 100 MG/1
25 TABLET ORAL EVERY 12 HOURS SCHEDULED
Qty: 60 TABLET | Refills: 0 | Status: SHIPPED | OUTPATIENT
Start: 2024-10-12

## 2024-10-12 RX ORDER — SULFAMETHOXAZOLE/TRIMETHOPRIM 800-160 MG
1 TABLET ORAL 2 TIMES DAILY
Qty: 60 TABLET | Refills: 0 | Status: SHIPPED | OUTPATIENT
Start: 2024-10-12 | End: 2024-11-11

## 2024-10-12 RX ADMIN — NICOTINE 1 PATCH: 21 PATCH TRANSDERMAL at 08:55

## 2024-10-12 RX ADMIN — LEVOFLOXACIN 750 MG: 5 INJECTION, SOLUTION INTRAVENOUS at 10:27

## 2024-10-12 RX ADMIN — APIXABAN 5 MG: 5 TABLET, FILM COATED ORAL at 08:51

## 2024-10-12 RX ADMIN — Medication 10 ML: at 08:58

## 2024-10-12 RX ADMIN — NICOTINE POLACRILEX 4 MG: 2 GUM, CHEWING BUCCAL at 14:30

## 2024-10-12 RX ADMIN — LAMOTRIGINE 25 MG: 25 TABLET ORAL at 08:51

## 2024-10-12 RX ADMIN — CITALOPRAM HYDROBROMIDE 40 MG: 20 TABLET ORAL at 08:51

## 2024-10-12 RX ADMIN — Medication 10 ML: at 08:53

## 2024-10-12 RX ADMIN — GABAPENTIN 800 MG: 400 CAPSULE ORAL at 08:51

## 2024-10-12 RX ADMIN — IBUPROFEN 200 MG: 200 TABLET, FILM COATED ORAL at 14:30

## 2024-10-12 RX ADMIN — LORAZEPAM 0.5 MG: 0.5 TABLET ORAL at 12:01

## 2024-10-12 NOTE — PLAN OF CARE
Goal Outcome Evaluation: Pt adequate for discharge per Dr. Logan.

## 2024-10-12 NOTE — DISCHARGE SUMMARY
[facility]   INTERNAL MEDICINE DISCHARGE SUMMARY      Name:  J Luis Estrada   Age:  39 y.o.  Sex:  male  :  1985  MRN:  [id number]   Visit Number:  [id number]  Primary Care Physician:  Marquez Logan MD  Date of Discharge:  10/12/2024  Admission Date:  10/9/2024      Discharge Diagnosis:         Foot osteomyelitis, left    Osteomyelitis of ankle or foot, acute    Bipolar affective disorder, currently active    Anxiety          Consults:     Consults       Date and Time Order Name Status Description    10/10/2024  3:45 PM Inpatient Orthopedic Surgery Consult      10/9/2024 12:52 PM Inpatient Podiatry Consult Completed             Procedures Performed:                 Hospital Course:   The patient was admitted on 10/9/2024  Please see H&P for details on admission HPI and ROS.    39-year-old patient with history of bipolar depression and anxiety disorder who is prediabetic has been having worsening of the left foot pain swelling and osteomyelitis of the metatarsal for which he has failed outpatient management and is being admitted for PICC line placement with IV antibiotics and further surgical evaluation.     His PICC line has been placed yesterday and he has been started on vancomycin after cultures.  He is having a lot of swelling of the foot and difficulty in walking with pain.     Patient seen on .  His antibiotic has been switched over today from vancomycin to Levaquin as his MRSA screen has been negative.  Dr. De La Torre's consult has been seen and appreciated  After starting on IV Levaquin and he will be getting it for 1 week IV and then switch over to oral.  He will be getting that with the Bactrim combination for the next 4 to 6 weeks.  Follow-up with Dr. De La Torre to be done as per her recommendations but I will see him in the next 10 to 14 days.  He has been restarted on Lamictal and dose has been adjusted.  Patient will be discharged and close follow-up will be done  as outpatient basis from your onwards.   have been consulted and arrangements have been done for outpatient IV antibiotic Levaquin 750 mg daily for 7 more days and then will keep the PICC line and will need flushing and management for the PICC line with ambulatory home services    Vital Signs:     Temp:  [97.6 °F (36.4 °C)-98.1 °F (36.7 °C)] 97.8 °F (36.6 °C)  Heart Rate:  [76] 76  Resp:  [18-20] 20  BP: ()/(63-95) 122/83    Physical Exam:     General Appearance:    Alert and cooperative, not in any acute distress.   Head:    Atraumatic and normocephalic, without obvious abnormality.   Eyes:            PERRLA.  No pallor. Extraocular movements are within normal limits.   Neck:   Supple. No lymph glands, no bruit.   Lungs:     Chest shape is normal. Breath sounds heard bilaterally equally.  No crackles or wheezing.     Heart:    Normal S1 and S2, no murmur,  no JVD.   Abdomen:     Normal bowel sounds, no masses, no organomegaly. Soft     nontender, no guarding, no rebound tenderness.   Extremities:   Moves all extremities well.  Left foot swelling and edema, no cyanosis.   Skin:   No bruising or rash.   Neurologic:   Grossly nonfocal and moves all extremities.        Pertinent Lab Results:     Results from last 7 days   Lab Units 10/11/24  0610 10/10/24  0544 10/09/24  1344   SODIUM mmol/L 139 137 135*   POTASSIUM mmol/L 4.8 4.8 4.4   CHLORIDE mmol/L 104 104 102   CO2 mmol/L 24.4 22.9 20.5*   BUN mg/dL 15 12 10   CREATININE mg/dL 0.89 1.03 1.02   CALCIUM mg/dL 10.1 8.9 8.7   BILIRUBIN mg/dL  --  0.2 <0.2   ALK PHOS U/L  --  78 88   ALT (SGPT) U/L  --  14 17   AST (SGOT) U/L  --  18 25   GLUCOSE mg/dL 113* 125* 129*     Results from last 7 days   Lab Units 10/10/24  0544 10/09/24  1343   WBC 10*3/mm3 7.56 7.16   HEMOGLOBIN g/dL 14.0 13.6   HEMATOCRIT % 41.7 41.8   PLATELETS 10*3/mm3 225 222         Blood Culture   Date Value Ref Range Status   10/09/2024 No growth at 3 days  Preliminary    10/09/2024 No growth at 3 days  Preliminary       Pertinent Radiology Results:    Imaging Results (Most Recent)       Procedure Component Value Units Date/Time    MRI Foot Left With & Without Contrast [[id number]] Collected: 10/10/24 1035     Updated: 10/10/24 1123    Narrative:      MR LEFT FOOT, WITHOUT AND WITH GADOLINIUM ENHANCEMENT.     COMPARISON: 04/14/2017     TECHNIQUE: Multiplanar MR, without and with gadolinium enhancement.     FINDINGS: There is abnormal marrow signal with marrow replacement,  enhancement and bone destruction centered at the first MTP joint.  Destructive changes are more pronounced of the first metatarsal head  although there is destruction also noted involving the base of the first  proximal phalanx. Features are most suggestive of septic arthritis with  osteomyelitis. There are also similar but less pronounced changes at the  first interphalangeal joint also compatible with septic arthritis and  osteomyelitis.     There is thickening of the soft tissues without significant enhancement  surrounding the fifth metatarsal head. This is probably scar tissue  given lack of significant enhancement. There are no abnormal marrow  signal changes evident.     New cystic changes are seen of the second and third metatarsal heads  which may be arthritic in nature.       Impression:      Osteomyelitis and septic arthritis involving most  significantly the distal first metatarsal and MTP joint but also to a  lesser extent the first interphalangeal joint with adjacent phalanges.     This report was signed and finalized on 10/10/2024 11:21 AM by Jc Casey MD.                       Discharge Disposition:      Home-Health Care [facility] [place]    Discharge Medication:         Discharge Medications        New Medications        Instructions Start Date   levoFLOXacin (LEVAQUIN) 750 mg/150 mL D5W (premix) 750 MG/150ML solution IVPB  Commonly known as: LEVAQUIN   Daily for 7 days      levoFLOXacin 750 MG  tablet  Commonly known as: Levaquin   750 mg, Oral, Daily, Start oral medicine after 1 week of IV antibiotic is finished             Changes to Medications        Instructions Start Date   lamoTRIgine 100 MG tablet  Commonly known as: LaMICtal  What changed:   medication strength  how much to take  when to take this  additional instructions   25 mg, Oral, Every 12 Hours Scheduled             Continue These Medications        Instructions Start Date   albuterol (2.5 MG/3ML) 0.083% nebulizer solution  Commonly known as: PROVENTIL   2.5 mg, Nebulization, Every 4 Hours PRN      apixaban 5 MG tablet tablet  Commonly known as: ELIQUIS   Oral, 2 Times Daily, Pt is unsure of dose      citalopram 40 MG tablet  Commonly known as: CeleXA   40 mg, Oral, Daily      diphenhydrAMINE 25 mg capsule  Commonly known as: BENADRYL   25 mg, Oral, Every 6 Hours PRN      gabapentin 800 MG tablet  Commonly known as: NEURONTIN   800 mg, Oral, 2 Times Daily      ibuprofen 800 MG tablet  Commonly known as: ADVIL,MOTRIN   800 mg, Oral, Every 8 Hours PRN      loratadine 10 MG tablet  Commonly known as: CLARITIN   10 mg, Oral, Daily      NON FORMULARY   100 mg, Subcutaneous, Every 30 Days, Sublocade(Suboxen), Pt states he usually takes it on the 9th of each month      ondansetron ODT 4 MG disintegrating tablet  Commonly known as: ZOFRAN-ODT   4 mg, Oral, Every 8 Hours PRN      sulfamethoxazole-trimethoprim 800-160 MG per tablet  Commonly known as: BACTRIM DS,SEPTRA DS   1 tablet, Oral, 2 Times Daily, Pt taking for left foot osteo               Discharge Diet:             Follow-up Appointments:      Follow-up will be done by me in the office after 10 days he already has a scheduled appointment    Future Appointments   Date Time Provider Department Center   10/18/2024  1:30 PM TREATMENT RM 8  [facility]   10/25/2024  1:00 PM TREATMENT RM 15  [facility]   11/1/2024  1:00 PM TREATMENT  15  [facility]   [facility]   11/8/2024 12:30 PM TREATMENT RM 13  [facility]  [facility]   11/15/2024  1:00 PM TREATMENT RM 12  [facility]  [facility]   11/22/2024  1:00 PM TREATMENT RM 15  [facility]  [facility]     Additional Instructions for the Follow-ups that You Need to Schedule       Ambulatory Referral to Home Health (Hospital)   As directed      Face to Face Visit Date: 10/12/2024   Follow-up provider for Plan of Care?: I will be treating the patient on an ongoing basis.  Please send me the Plan of Care for signature.   Follow-up provider: PIO LOGAN [[id number]]   Reason/Clinical Findings: He has osteomyelitis of the foot needing IV antibiotics at home   Describe mobility limitations that make leaving home difficult: Because of osteomyelitis of the foot he cannot walk much   Nursing/Therapeutic Services Requested: Skilled Nursing   Skilled nursing orders: Medication education Infusion therapy   Frequency: 1 Week 1                Test Results Pending at Discharge:      Pending Labs       Order Current Status    Blood Culture - Blood, Wrist, Left Preliminary result    Blood Culture - Blood, Wrist, Right Preliminary result               Pio Logan MD  10/12/24  14:35 EDT    Time:  Discharge 39 min    Please note that portions of this note were completed with a voice recognition program.

## 2024-10-12 NOTE — PLAN OF CARE
Problem: Infection  Goal: Absence of Infection Signs and Symptoms  Outcome: Progressing     Problem: Anxiety Signs/Symptoms  Goal: Optimized Energy Level (Anxiety Signs/Symptoms)  Outcome: Progressing  Goal: Optimized Cognitive Function (Anxiety Signs/Symptoms)  Outcome: Progressing  Goal: Improved Mood Symptoms (Anxiety Signs/Symptoms)  Outcome: Progressing  Goal: Improved Sleep (Anxiety Signs/Symptoms)  Outcome: Progressing  Goal: Enhanced Social, Occupational or Functional Skills (Anxiety Signs/Symptoms)  Outcome: Progressing  Goal: Improved Somatic Symptoms (Anxiety Signs/Symptoms)  Outcome: Progressing   Goal Outcome Evaluation:

## 2024-10-13 NOTE — PAYOR COMM NOTE
"TO:[employer]  FROM:ANAI MANN RN PHONE [phone]  [facility]    J Luis Elizabeth (39 y.o. Male)       Date of Birth   1985    Social Security Number       Address   [address] [address]    Home Phone   [phone]    MRN   [id number]       Congregational   None    Marital Status                               Admission Date   10/9/24    Admission Type   Elective    Admitting Provider   Marquez Logan MD    Attending Provider       Department, Room/Bed   [facility] TELEMETRY 3, 307/1       Discharge Date   10/12/2024    Discharge Disposition   Home-Health Care Sv    Discharge Destination                                 Attending Provider: (none)   Allergies: Penicillins, Vancomycin    Isolation: None   Infection: None   Code Status: Prior    Ht: 198.1 cm (78\")   Wt: 152 kg (336 lb 3.2 oz)    Admission Cmt: None   Principal Problem: Foot osteomyelitis, left [M86.9]                   Active Insurance as of 10/9/2024       Primary Coverage       Payor Plan Insurance Group Employer/Plan Group    [employer] [place] [employer]ICA[employer]        Payor Plan Address Payor Plan Phone Number Payor Plan Fax Number Effective Dates    [address] [address] [phone]  10/1/2024 - None Entered    [facility] [id number]         Subscriber Name Subscriber Birth Date Member ID       J LUIS ELIZABETH 1985 [id number]                     Emergency Contacts        (Rel.) Home Phone Work Phone Mobile Phone    Uzma Elizabeth (Mother) [phone] -- --                 Discharge Summary        Marquez Logan MD at 10/12/24 [facility]              [facility]   INTERNAL MEDICINE DISCHARGE SUMMARY      Name:  J Luis Elizabeth   Age:  39 y.o.  Sex:  male  :  1985  MRN:  [id number]   Visit Number:  [id number]  Primary Care Physician:  Marquez Logan MD  Date of Discharge:  10/12/2024  Admission Date:  10/9/2024      Discharge " Diagnosis:         Foot osteomyelitis, left    Osteomyelitis of ankle or foot, acute    Bipolar affective disorder, currently active    Anxiety          Consults:     Consults       Date and Time Order Name Status Description    10/10/2024  3:45 PM Inpatient Orthopedic Surgery Consult      10/9/2024 12:52 PM Inpatient Podiatry Consult Completed             Procedures Performed:                 Hospital Course:   The patient was admitted on 10/9/2024  Please see H&P for details on admission HPI and ROS.    39-year-old patient with history of bipolar depression and anxiety disorder who is prediabetic has been having worsening of the left foot pain swelling and osteomyelitis of the metatarsal for which he has failed outpatient management and is being admitted for PICC line placement with IV antibiotics and further surgical evaluation.     His PICC line has been placed yesterday and he has been started on vancomycin after cultures.  He is having a lot of swelling of the foot and difficulty in walking with pain.     Patient seen on October 11.  His antibiotic has been switched over today from vancomycin to Levaquin as his MRSA screen has been negative.  Dr. De La Torre's consult has been seen and appreciated  After starting on IV Levaquin and he will be getting it for 1 week IV and then switch over to oral.  He will be getting that with the Bactrim combination for the next 4 to 6 weeks.  Follow-up with Dr. De La Torre to be done as per her recommendations but I will see him in the next 10 to 14 days.  He has been restarted on Lamictal and dose has been adjusted.  Patient will be discharged and close follow-up will be done as outpatient basis from your onwards.   have been consulted and arrangements have been done for outpatient IV antibiotic Levaquin 750 mg daily for 7 more days and then will keep the PICC line and will need flushing and management for the PICC line with ambulatory home services    Vital Signs:      Temp:  [97.6 °F (36.4 °C)-98.1 °F (36.7 °C)] 97.8 °F (36.6 °C)  Heart Rate:  [76] 76  Resp:  [18-20] 20  BP: ()/(63-95) 122/83    Physical Exam:     General Appearance:    Alert and cooperative, not in any acute distress.   Head:    Atraumatic and normocephalic, without obvious abnormality.   Eyes:            PERRLA.  No pallor. Extraocular movements are within normal limits.   Neck:   Supple. No lymph glands, no bruit.   Lungs:     Chest shape is normal. Breath sounds heard bilaterally equally.  No crackles or wheezing.     Heart:    Normal S1 and S2, no murmur,  no JVD.   Abdomen:     Normal bowel sounds, no masses, no organomegaly. Soft     nontender, no guarding, no rebound tenderness.   Extremities:   Moves all extremities well.  Left foot swelling and edema, no cyanosis.   Skin:   No bruising or rash.   Neurologic:   Grossly nonfocal and moves all extremities.        Pertinent Lab Results:     Results from last 7 days   Lab Units 10/11/24  0610 10/10/24  0544 10/09/24  1344   SODIUM mmol/L 139 137 135*   POTASSIUM mmol/L 4.8 4.8 4.4   CHLORIDE mmol/L 104 104 102   CO2 mmol/L 24.4 22.9 20.5*   BUN mg/dL 15 12 10   CREATININE mg/dL 0.89 1.03 1.02   CALCIUM mg/dL 10.1 8.9 8.7   BILIRUBIN mg/dL  --  0.2 <0.2   ALK PHOS U/L  --  78 88   ALT (SGPT) U/L  --  14 17   AST (SGOT) U/L  --  18 25   GLUCOSE mg/dL 113* 125* 129*     Results from last 7 days   Lab Units 10/10/24  0544 10/09/24  1343   WBC 10*3/mm3 7.56 7.16   HEMOGLOBIN g/dL 14.0 13.6   HEMATOCRIT % 41.7 41.8   PLATELETS 10*3/mm3 225 222         Blood Culture   Date Value Ref Range Status   10/09/2024 No growth at 3 days  Preliminary   10/09/2024 No growth at 3 days  Preliminary       Pertinent Radiology Results:    Imaging Results (Most Recent)       Procedure Component Value Units Date/Time    MRI Foot Left With & Without Contrast [[id number]] Collected: 10/10/24 1035     Updated: 10/10/24 1123    Narrative:      MR LEFT FOOT, WITHOUT AND WITH  GADOLINIUM ENHANCEMENT.     COMPARISON: 04/14/2017     TECHNIQUE: Multiplanar MR, without and with gadolinium enhancement.     FINDINGS: There is abnormal marrow signal with marrow replacement,  enhancement and bone destruction centered at the first MTP joint.  Destructive changes are more pronounced of the first metatarsal head  although there is destruction also noted involving the base of the first  proximal phalanx. Features are most suggestive of septic arthritis with  osteomyelitis. There are also similar but less pronounced changes at the  first interphalangeal joint also compatible with septic arthritis and  osteomyelitis.     There is thickening of the soft tissues without significant enhancement  surrounding the fifth metatarsal head. This is probably scar tissue  given lack of significant enhancement. There are no abnormal marrow  signal changes evident.     New cystic changes are seen of the second and third metatarsal heads  which may be arthritic in nature.       Impression:      Osteomyelitis and septic arthritis involving most  significantly the distal first metatarsal and MTP joint but also to a  lesser extent the first interphalangeal joint with adjacent phalanges.     This report was signed and finalized on 10/10/2024 11:21 AM by Jc Casey MD.                       Discharge Disposition:      Home-Health Care [facility] [place]    Discharge Medication:         Discharge Medications        New Medications        Instructions Start Date   levoFLOXacin (LEVAQUIN) 750 mg/150 mL D5W (premix) 750 MG/150ML solution IVPB  Commonly known as: LEVAQUIN   Daily for 7 days      levoFLOXacin 750 MG tablet  Commonly known as: Levaquin   750 mg, Oral, Daily, Start oral medicine after 1 week of IV antibiotic is finished             Changes to Medications        Instructions Start Date   lamoTRIgine 100 MG tablet  Commonly known as: LaMICtal  What changed:   medication strength  how much to take  when to take  this  additional instructions   25 mg, Oral, Every 12 Hours Scheduled             Continue These Medications        Instructions Start Date   albuterol (2.5 MG/3ML) 0.083% nebulizer solution  Commonly known as: PROVENTIL   2.5 mg, Nebulization, Every 4 Hours PRN      apixaban 5 MG tablet tablet  Commonly known as: ELIQUIS   Oral, 2 Times Daily, Pt is unsure of dose      citalopram 40 MG tablet  Commonly known as: CeleXA   40 mg, Oral, Daily      diphenhydrAMINE 25 mg capsule  Commonly known as: BENADRYL   25 mg, Oral, Every 6 Hours PRN      gabapentin 800 MG tablet  Commonly known as: NEURONTIN   800 mg, Oral, 2 Times Daily      ibuprofen 800 MG tablet  Commonly known as: ADVIL,MOTRIN   800 mg, Oral, Every 8 Hours PRN      loratadine 10 MG tablet  Commonly known as: CLARITIN   10 mg, Oral, Daily      NON FORMULARY   100 mg, Subcutaneous, Every 30 Days, Sublocade(Suboxen), Pt states he usually takes it on the 9th of each month      ondansetron ODT 4 MG disintegrating tablet  Commonly known as: ZOFRAN-ODT   4 mg, Oral, Every 8 Hours PRN      sulfamethoxazole-trimethoprim 800-160 MG per tablet  Commonly known as: BACTRIM DS,SEPTRA DS   1 tablet, Oral, 2 Times Daily, Pt taking for left foot osteo               Discharge Diet:             Follow-up Appointments:      Follow-up will be done by me in the office after 10 days he already has a scheduled appointment    Future Appointments   Date Time Provider Department Center   10/18/2024  1:30 PM TREATMENT RM 8  [facility]   10/25/2024  1:00 PM TREATMENT RM 15  [facility]  [facility]   11/1/2024  1:00 PM TREATMENT RM 15  [facility]  [facility]   11/8/2024 12:30 PM TREATMENT RM 13  [facility]  [facility]   11/15/2024  1:00 PM TREATMENT RM 12  [facility]   11/22/2024  1:00 PM TREATMENT RM 15  [facility]     Additional Instructions for the Follow-ups that You Need to Schedule       Ambulatory  Referral to Home Health (Hospital)   As directed      Face to Face Visit Date: 10/12/2024   Follow-up provider for Plan of Care?: I will be treating the patient on an ongoing basis.  Please send me the Plan of Care for signature.   Follow-up provider: PIO LOGAN [[id number]]   Reason/Clinical Findings: He has osteomyelitis of the foot needing IV antibiotics at home   Describe mobility limitations that make leaving home difficult: Because of osteomyelitis of the foot he cannot walk much   Nursing/Therapeutic Services Requested: Skilled Nursing   Skilled nursing orders: Medication education Infusion therapy   Frequency: 1 Week 1                Test Results Pending at Discharge:      Pending Labs       Order Current Status    Blood Culture - Blood, Wrist, Left Preliminary result    Blood Culture - Blood, Wrist, Right Preliminary result               Pio Logan MD  10/12/24  14:35 EDT    Time:  Discharge 39 min    Please note that portions of this note were completed with a voice recognition program.        Electronically signed by Pio Logan MD at 10/12/24 5294

## 2024-10-14 ENCOUNTER — TELEPHONE (OUTPATIENT)
Dept: TELEMETRY | Facility: HOSPITAL | Age: 39
End: 2024-10-14
Payer: COMMERCIAL

## 2024-10-14 LAB
BACTERIA SPEC AEROBE CULT: NORMAL
BACTERIA SPEC AEROBE CULT: NORMAL

## 2024-10-14 NOTE — CASE MANAGEMENT/SOCIAL WORK
Case Management Discharge Note      Final Note: Pt discharged home with mother via private car. Pt going home on IV abx therapy. [facility] will deliver antibiotics tonight. Pt to come in to [facility] infusion center weekly for PICC dressing changes. First appointment scheduled 10/18. PICC line dressing changed by primary nurse today prior to discharge. Pt has previously been on home abx therapy. Mother was educated on how to give abx. Pt is aware of appointment times.    Provided Post Acute Provider List?:  (To be determined)    Selected Continued Care - Discharged on 10/12/2024 Admission date: 10/9/2024 - Discharge disposition: Home-Health Care Svc      Destination    No services have been selected for the patient.                Durable Medical Equipment    No services have been selected for the patient.                Dialysis/Infusion Coordination complete.      Service Provider Services Address Phone Fax Patient Preferred    [facility] Op Infu Non Onc Infusion and IV Therapy [address] [facility] [address] [phone] --       Internal Comment last updated by Aaliyah Dutton RN 10/14/2024 1311    Pt will come in weekly for PICC dressing changes. First apt scheduled for 10/18. Per Dr. Logan pt to keep PICC line in for a few weeks after IV antibiotic therapy is complete in case it is needed again.               [facility] IV [facility] [address] [facility] [address] [phone] --       Internal Comment last updated by Aaliyah Dutton RN 10/14/2024 1315    [facility] will provide pt with IV antibiotics. To be delivered tonight 10/12.                         Home Medical Care    No services have been selected for the patient.                Therapy    No services have been selected for the patient.                Community Resources Coordination complete.      Service Provider Services Address Phone Fax Patient  Preferred    [facility] PATIENTS ONLY FOOD [facility] [address] [facility] [address] [phone] -- --              Community & DME    No services have been selected for the patient.                    Transportation Services  Private: Car    Final Discharge Disposition Code: 01 - home or self-care

## 2024-10-18 ENCOUNTER — HOSPITAL ENCOUNTER (OUTPATIENT)
Facility: HOSPITAL | Age: 39
Discharge: HOME | End: 2024-10-18
Payer: COMMERCIAL

## 2024-10-18 VITALS
TEMPERATURE: 98.4 F | HEART RATE: 88 BPM | OXYGEN SATURATION: 98 % | RESPIRATION RATE: 16 BRPM | SYSTOLIC BLOOD PRESSURE: 143 MMHG | DIASTOLIC BLOOD PRESSURE: 79 MMHG

## 2024-10-18 DIAGNOSIS — M86.172 ACUTE OSTEOMYELITIS OF LEFT ANKLE OR FOOT: Primary | ICD-10-CM

## 2024-10-18 PROCEDURE — G0463 HOSPITAL OUTPT CLINIC VISIT: HCPCS

## 2024-10-18 RX ORDER — SODIUM CHLORIDE 0.9 % (FLUSH) 0.9 %
10 SYRINGE (ML) INJECTION AS NEEDED
Status: DISCONTINUED | OUTPATIENT
Start: 2024-10-18 | End: 2024-10-19 | Stop reason: HOSPADM

## 2024-10-18 RX ORDER — SODIUM CHLORIDE 0.9 % (FLUSH) 0.9 %
10 SYRINGE (ML) INJECTION AS NEEDED
Status: CANCELLED | OUTPATIENT
Start: 2024-10-21

## 2024-10-21 ENCOUNTER — HOSPITAL ENCOUNTER (OUTPATIENT)
Facility: HOSPITAL | Age: 39
Discharge: HOME | End: 2024-10-21
Admitting: INTERNAL MEDICINE
Payer: COMMERCIAL

## 2024-10-21 DIAGNOSIS — M86.172 ACUTE OSTEOMYELITIS OF LEFT ANKLE OR FOOT: Primary | ICD-10-CM

## 2024-10-21 LAB
ALBUMIN SERPL-MCNC: 4 G/DL (ref 3.5–5.2)
ALBUMIN/GLOB SERPL: 1.1 G/DL
ALP SERPL-CCNC: 87 U/L (ref 39–117)
ALT SERPL W P-5'-P-CCNC: 20 U/L (ref 1–41)
ANION GAP SERPL CALCULATED.3IONS-SCNC: 10.7 MMOL/L (ref 5–15)
AST SERPL-CCNC: 37 U/L (ref 1–40)
BASOPHILS # BLD AUTO: 0.05 10*3/MM3 (ref 0–0.2)
BASOPHILS NFR BLD AUTO: 0.6 % (ref 0–1.5)
BILIRUB SERPL-MCNC: 0.4 MG/DL (ref 0–1.2)
BUN SERPL-MCNC: 25 MG/DL (ref 6–20)
BUN/CREAT SERPL: 30.1 (ref 7–25)
CALCIUM SPEC-SCNC: 8.9 MG/DL (ref 8.6–10.5)
CHLORIDE SERPL-SCNC: 101 MMOL/L (ref 98–107)
CO2 SERPL-SCNC: 23.3 MMOL/L (ref 22–29)
CREAT SERPL-MCNC: 0.83 MG/DL (ref 0.76–1.27)
DEPRECATED RDW RBC AUTO: 48 FL (ref 37–54)
EGFRCR SERPLBLD CKD-EPI 2021: 112.8 ML/MIN/1.73
EOSINOPHIL # BLD AUTO: 0.38 10*3/MM3 (ref 0–0.4)
EOSINOPHIL NFR BLD AUTO: 4.8 % (ref 0.3–6.2)
ERYTHROCYTE [DISTWIDTH] IN BLOOD BY AUTOMATED COUNT: 15 % (ref 12.3–15.4)
GLOBULIN UR ELPH-MCNC: 3.6 GM/DL
GLUCOSE SERPL-MCNC: 102 MG/DL (ref 65–99)
HCT VFR BLD AUTO: 39.3 % (ref 37.5–51)
HGB BLD-MCNC: 13.3 G/DL (ref 13–17.7)
IMM GRANULOCYTES # BLD AUTO: 0.04 10*3/MM3 (ref 0–0.05)
IMM GRANULOCYTES NFR BLD AUTO: 0.5 % (ref 0–0.5)
LYMPHOCYTES # BLD AUTO: 1.68 10*3/MM3 (ref 0.7–3.1)
LYMPHOCYTES NFR BLD AUTO: 21.3 % (ref 19.6–45.3)
MCH RBC QN AUTO: 29.4 PG (ref 26.6–33)
MCHC RBC AUTO-ENTMCNC: 33.8 G/DL (ref 31.5–35.7)
MCV RBC AUTO: 86.9 FL (ref 79–97)
MONOCYTES # BLD AUTO: 1.07 10*3/MM3 (ref 0.1–0.9)
MONOCYTES NFR BLD AUTO: 13.6 % (ref 5–12)
NEUTROPHILS NFR BLD AUTO: 4.65 10*3/MM3 (ref 1.7–7)
NEUTROPHILS NFR BLD AUTO: 59.2 % (ref 42.7–76)
NRBC BLD AUTO-RTO: 0 /100 WBC (ref 0–0.2)
PLATELET # BLD AUTO: 175 10*3/MM3 (ref 140–450)
PMV BLD AUTO: 9.9 FL (ref 6–12)
POTASSIUM SERPL-SCNC: 4.6 MMOL/L (ref 3.5–5.2)
PROT SERPL-MCNC: 7.6 G/DL (ref 6–8.5)
RBC # BLD AUTO: 4.52 10*6/MM3 (ref 4.14–5.8)
SODIUM SERPL-SCNC: 135 MMOL/L (ref 136–145)
WBC NRBC COR # BLD AUTO: 7.87 10*3/MM3 (ref 3.4–10.8)

## 2024-10-21 PROCEDURE — 36592 COLLECT BLOOD FROM PICC: CPT

## 2024-10-21 PROCEDURE — 80053 COMPREHEN METABOLIC PANEL: CPT | Performed by: INTERNAL MEDICINE

## 2024-10-21 PROCEDURE — 85025 COMPLETE CBC W/AUTO DIFF WBC: CPT | Performed by: INTERNAL MEDICINE

## 2024-10-21 RX ORDER — SODIUM CHLORIDE 0.9 % (FLUSH) 0.9 %
10 SYRINGE (ML) INJECTION AS NEEDED
OUTPATIENT
Start: 2024-11-04

## 2024-10-30 ENCOUNTER — HOSPITAL ENCOUNTER (OUTPATIENT)
Facility: HOSPITAL | Age: 39
Discharge: HOME | End: 2024-10-30
Admitting: INTERNAL MEDICINE
Payer: COMMERCIAL

## 2024-10-30 DIAGNOSIS — M86.172 ACUTE OSTEOMYELITIS OF LEFT ANKLE OR FOOT: Primary | ICD-10-CM

## 2024-10-30 LAB
ALBUMIN SERPL-MCNC: 4 G/DL (ref 3.5–5.2)
ALBUMIN/GLOB SERPL: 1.1 G/DL
ALP SERPL-CCNC: 92 U/L (ref 39–117)
ALT SERPL W P-5'-P-CCNC: 20 U/L (ref 1–41)
ANION GAP SERPL CALCULATED.3IONS-SCNC: 8.9 MMOL/L (ref 5–15)
AST SERPL-CCNC: 33 U/L (ref 1–40)
BASOPHILS # BLD AUTO: 0.02 10*3/MM3 (ref 0–0.2)
BASOPHILS NFR BLD AUTO: 0.3 % (ref 0–1.5)
BILIRUB SERPL-MCNC: 0.4 MG/DL (ref 0–1.2)
BUN SERPL-MCNC: 27 MG/DL (ref 6–20)
BUN/CREAT SERPL: 27.8 (ref 7–25)
CALCIUM SPEC-SCNC: 9.1 MG/DL (ref 8.6–10.5)
CHLORIDE SERPL-SCNC: 103 MMOL/L (ref 98–107)
CO2 SERPL-SCNC: 24.1 MMOL/L (ref 22–29)
CREAT SERPL-MCNC: 0.97 MG/DL (ref 0.76–1.27)
DEPRECATED RDW RBC AUTO: 48 FL (ref 37–54)
EGFRCR SERPLBLD CKD-EPI 2021: 100.6 ML/MIN/1.73
EOSINOPHIL # BLD AUTO: 0.42 10*3/MM3 (ref 0–0.4)
EOSINOPHIL NFR BLD AUTO: 5.4 % (ref 0.3–6.2)
ERYTHROCYTE [DISTWIDTH] IN BLOOD BY AUTOMATED COUNT: 14.8 % (ref 12.3–15.4)
GLOBULIN UR ELPH-MCNC: 3.6 GM/DL
GLUCOSE SERPL-MCNC: 135 MG/DL (ref 65–99)
HCT VFR BLD AUTO: 38.2 % (ref 37.5–51)
HGB BLD-MCNC: 13 G/DL (ref 13–17.7)
IMM GRANULOCYTES # BLD AUTO: 0.04 10*3/MM3 (ref 0–0.05)
IMM GRANULOCYTES NFR BLD AUTO: 0.5 % (ref 0–0.5)
LYMPHOCYTES # BLD AUTO: 2.01 10*3/MM3 (ref 0.7–3.1)
LYMPHOCYTES NFR BLD AUTO: 25.8 % (ref 19.6–45.3)
MCH RBC QN AUTO: 29.7 PG (ref 26.6–33)
MCHC RBC AUTO-ENTMCNC: 34 G/DL (ref 31.5–35.7)
MCV RBC AUTO: 87.2 FL (ref 79–97)
MONOCYTES # BLD AUTO: 0.83 10*3/MM3 (ref 0.1–0.9)
MONOCYTES NFR BLD AUTO: 10.7 % (ref 5–12)
NEUTROPHILS NFR BLD AUTO: 4.46 10*3/MM3 (ref 1.7–7)
NEUTROPHILS NFR BLD AUTO: 57.3 % (ref 42.7–76)
NRBC BLD AUTO-RTO: 0 /100 WBC (ref 0–0.2)
PLATELET # BLD AUTO: 201 10*3/MM3 (ref 140–450)
PMV BLD AUTO: 9.6 FL (ref 6–12)
POTASSIUM SERPL-SCNC: 4.4 MMOL/L (ref 3.5–5.2)
PROT SERPL-MCNC: 7.6 G/DL (ref 6–8.5)
RBC # BLD AUTO: 4.38 10*6/MM3 (ref 4.14–5.8)
SODIUM SERPL-SCNC: 136 MMOL/L (ref 136–145)
WBC NRBC COR # BLD AUTO: 7.78 10*3/MM3 (ref 3.4–10.8)

## 2024-10-30 PROCEDURE — 80053 COMPREHEN METABOLIC PANEL: CPT | Performed by: INTERNAL MEDICINE

## 2024-10-30 PROCEDURE — 36591 DRAW BLOOD OFF VENOUS DEVICE: CPT

## 2024-10-30 PROCEDURE — 85025 COMPLETE CBC W/AUTO DIFF WBC: CPT | Performed by: INTERNAL MEDICINE

## 2024-10-30 RX ORDER — SODIUM CHLORIDE 0.9 % (FLUSH) 0.9 %
10 SYRINGE (ML) INJECTION AS NEEDED
Status: DISCONTINUED | OUTPATIENT
Start: 2024-10-30 | End: 2024-10-31 | Stop reason: HOSPADM

## 2024-10-30 RX ORDER — SODIUM CHLORIDE 0.9 % (FLUSH) 0.9 %
10 SYRINGE (ML) INJECTION AS NEEDED
Status: CANCELLED | OUTPATIENT
Start: 2024-10-30

## 2024-10-30 RX ORDER — SODIUM CHLORIDE 0.9 % (FLUSH) 0.9 %
10 SYRINGE (ML) INJECTION AS NEEDED
Status: CANCELLED | OUTPATIENT
Start: 2024-11-04

## 2024-11-04 ENCOUNTER — HOSPITAL ENCOUNTER (OUTPATIENT)
Facility: HOSPITAL | Age: 39
Discharge: HOME | End: 2024-11-04
Admitting: INTERNAL MEDICINE
Payer: COMMERCIAL

## 2024-11-04 DIAGNOSIS — M86.179 ACUTE OSTEOMYELITIS OF ANKLE OR FOOT, UNSPECIFIED LATERALITY: ICD-10-CM

## 2024-11-04 DIAGNOSIS — M86.172 ACUTE OSTEOMYELITIS OF LEFT ANKLE OR FOOT: Primary | ICD-10-CM

## 2024-11-04 LAB
ALBUMIN SERPL-MCNC: 3.9 G/DL (ref 3.5–5.2)
ALBUMIN/GLOB SERPL: 1.1 G/DL
ALP SERPL-CCNC: 87 U/L (ref 39–117)
ALT SERPL W P-5'-P-CCNC: 17 U/L (ref 1–41)
ANION GAP SERPL CALCULATED.3IONS-SCNC: 11 MMOL/L (ref 5–15)
AST SERPL-CCNC: 22 U/L (ref 1–40)
BASOPHILS # BLD AUTO: 0.01 10*3/MM3 (ref 0–0.2)
BASOPHILS NFR BLD AUTO: 0.2 % (ref 0–1.5)
BILIRUB SERPL-MCNC: 0.2 MG/DL (ref 0–1.2)
BUN SERPL-MCNC: 10 MG/DL (ref 6–20)
BUN/CREAT SERPL: 11.2 (ref 7–25)
CALCIUM SPEC-SCNC: 9 MG/DL (ref 8.6–10.5)
CHLORIDE SERPL-SCNC: 102 MMOL/L (ref 98–107)
CO2 SERPL-SCNC: 24 MMOL/L (ref 22–29)
CREAT SERPL-MCNC: 0.89 MG/DL (ref 0.76–1.27)
DEPRECATED RDW RBC AUTO: 48.8 FL (ref 37–54)
EGFRCR SERPLBLD CKD-EPI 2021: 110.4 ML/MIN/1.73
EOSINOPHIL # BLD AUTO: 0.18 10*3/MM3 (ref 0–0.4)
EOSINOPHIL NFR BLD AUTO: 3.9 % (ref 0.3–6.2)
ERYTHROCYTE [DISTWIDTH] IN BLOOD BY AUTOMATED COUNT: 15 % (ref 12.3–15.4)
GLOBULIN UR ELPH-MCNC: 3.4 GM/DL
GLUCOSE SERPL-MCNC: 130 MG/DL (ref 65–99)
HCT VFR BLD AUTO: 39.5 % (ref 37.5–51)
HGB BLD-MCNC: 13.1 G/DL (ref 13–17.7)
IMM GRANULOCYTES # BLD AUTO: 0.02 10*3/MM3 (ref 0–0.05)
IMM GRANULOCYTES NFR BLD AUTO: 0.4 % (ref 0–0.5)
LYMPHOCYTES # BLD AUTO: 1.07 10*3/MM3 (ref 0.7–3.1)
LYMPHOCYTES NFR BLD AUTO: 23.2 % (ref 19.6–45.3)
MCH RBC QN AUTO: 29.3 PG (ref 26.6–33)
MCHC RBC AUTO-ENTMCNC: 33.2 G/DL (ref 31.5–35.7)
MCV RBC AUTO: 88.4 FL (ref 79–97)
MONOCYTES # BLD AUTO: 0.58 10*3/MM3 (ref 0.1–0.9)
MONOCYTES NFR BLD AUTO: 12.6 % (ref 5–12)
NEUTROPHILS NFR BLD AUTO: 2.75 10*3/MM3 (ref 1.7–7)
NEUTROPHILS NFR BLD AUTO: 59.7 % (ref 42.7–76)
NRBC BLD AUTO-RTO: 0 /100 WBC (ref 0–0.2)
PLATELET # BLD AUTO: 228 10*3/MM3 (ref 140–450)
PMV BLD AUTO: 9.5 FL (ref 6–12)
POTASSIUM SERPL-SCNC: 3.9 MMOL/L (ref 3.5–5.2)
PROT SERPL-MCNC: 7.3 G/DL (ref 6–8.5)
RBC # BLD AUTO: 4.47 10*6/MM3 (ref 4.14–5.8)
SODIUM SERPL-SCNC: 137 MMOL/L (ref 136–145)
WBC NRBC COR # BLD AUTO: 4.61 10*3/MM3 (ref 3.4–10.8)

## 2024-11-04 PROCEDURE — 36592 COLLECT BLOOD FROM PICC: CPT

## 2024-11-04 PROCEDURE — G0463 HOSPITAL OUTPT CLINIC VISIT: HCPCS

## 2024-11-04 PROCEDURE — 85025 COMPLETE CBC W/AUTO DIFF WBC: CPT

## 2024-11-04 PROCEDURE — 80053 COMPREHEN METABOLIC PANEL: CPT

## 2024-11-04 RX ORDER — SODIUM CHLORIDE 0.9 % (FLUSH) 0.9 %
10 SYRINGE (ML) INJECTION AS NEEDED
Status: DISCONTINUED | OUTPATIENT
Start: 2024-11-04 | End: 2024-11-05 | Stop reason: HOSPADM

## 2024-11-04 RX ORDER — SODIUM CHLORIDE 0.9 % (FLUSH) 0.9 %
10 SYRINGE (ML) INJECTION AS NEEDED
Status: CANCELLED | OUTPATIENT
Start: 2024-11-04

## 2024-11-11 ENCOUNTER — HOSPITAL ENCOUNTER (OUTPATIENT)
Facility: HOSPITAL | Age: 39
Discharge: HOME | End: 2024-11-11
Admitting: INTERNAL MEDICINE
Payer: COMMERCIAL

## 2024-11-11 VITALS
TEMPERATURE: 97.9 F | HEART RATE: 108 BPM | SYSTOLIC BLOOD PRESSURE: 123 MMHG | OXYGEN SATURATION: 98 % | DIASTOLIC BLOOD PRESSURE: 86 MMHG

## 2024-11-11 DIAGNOSIS — M86.172 ACUTE OSTEOMYELITIS OF LEFT ANKLE OR FOOT: Primary | ICD-10-CM

## 2024-11-11 PROCEDURE — G0463 HOSPITAL OUTPT CLINIC VISIT: HCPCS

## 2024-11-11 RX ORDER — SODIUM CHLORIDE 0.9 % (FLUSH) 0.9 %
10 SYRINGE (ML) INJECTION AS NEEDED
Status: DISCONTINUED | OUTPATIENT
Start: 2024-11-11 | End: 2024-11-12 | Stop reason: HOSPADM

## 2024-11-11 RX ORDER — SODIUM CHLORIDE 0.9 % (FLUSH) 0.9 %
10 SYRINGE (ML) INJECTION AS NEEDED
Status: CANCELLED | OUTPATIENT
Start: 2024-11-11

## 2024-11-18 ENCOUNTER — HOSPITAL ENCOUNTER (OUTPATIENT)
Facility: HOSPITAL | Age: 39
Discharge: HOME | End: 2024-11-18
Admitting: INTERNAL MEDICINE
Payer: COMMERCIAL

## 2024-11-18 DIAGNOSIS — M86.172 ACUTE OSTEOMYELITIS OF LEFT ANKLE OR FOOT: Primary | ICD-10-CM

## 2024-11-18 LAB
ALBUMIN SERPL-MCNC: 4.1 G/DL (ref 3.5–5.2)
ALBUMIN/GLOB SERPL: 1.1 G/DL
ALP SERPL-CCNC: 106 U/L (ref 39–117)
ALT SERPL W P-5'-P-CCNC: 16 U/L (ref 1–41)
ANION GAP SERPL CALCULATED.3IONS-SCNC: 14.9 MMOL/L (ref 5–15)
AST SERPL-CCNC: 24 U/L (ref 1–40)
BILIRUB SERPL-MCNC: 0.2 MG/DL (ref 0–1.2)
BUN SERPL-MCNC: 13 MG/DL (ref 6–20)
BUN/CREAT SERPL: 13 (ref 7–25)
CALCIUM SPEC-SCNC: 9.3 MG/DL (ref 8.6–10.5)
CHLORIDE SERPL-SCNC: 98 MMOL/L (ref 98–107)
CO2 SERPL-SCNC: 24.1 MMOL/L (ref 22–29)
CREAT SERPL-MCNC: 1 MG/DL (ref 0.76–1.27)
DEPRECATED RDW RBC AUTO: 45.3 FL (ref 37–54)
EGFRCR SERPLBLD CKD-EPI 2021: 97 ML/MIN/1.73
ERYTHROCYTE [DISTWIDTH] IN BLOOD BY AUTOMATED COUNT: 14.1 % (ref 12.3–15.4)
GLOBULIN UR ELPH-MCNC: 3.9 GM/DL
GLUCOSE SERPL-MCNC: 123 MG/DL (ref 65–99)
HCT VFR BLD AUTO: 41.3 % (ref 37.5–51)
HGB BLD-MCNC: 13.8 G/DL (ref 13–17.7)
MCH RBC QN AUTO: 29.4 PG (ref 26.6–33)
MCHC RBC AUTO-ENTMCNC: 33.4 G/DL (ref 31.5–35.7)
MCV RBC AUTO: 88.1 FL (ref 79–97)
PLATELET # BLD AUTO: 213 10*3/MM3 (ref 140–450)
PMV BLD AUTO: 9.7 FL (ref 6–12)
POTASSIUM SERPL-SCNC: 4.6 MMOL/L (ref 3.5–5.2)
PROT SERPL-MCNC: 8 G/DL (ref 6–8.5)
RBC # BLD AUTO: 4.69 10*6/MM3 (ref 4.14–5.8)
SODIUM SERPL-SCNC: 137 MMOL/L (ref 136–145)
WBC NRBC COR # BLD AUTO: 5.72 10*3/MM3 (ref 3.4–10.8)

## 2024-11-18 PROCEDURE — 80053 COMPREHEN METABOLIC PANEL: CPT | Performed by: INTERNAL MEDICINE

## 2024-11-18 PROCEDURE — 36592 COLLECT BLOOD FROM PICC: CPT

## 2024-11-18 PROCEDURE — 85027 COMPLETE CBC AUTOMATED: CPT | Performed by: INTERNAL MEDICINE

## 2024-11-18 RX ORDER — SODIUM CHLORIDE 0.9 % (FLUSH) 0.9 %
10 SYRINGE (ML) INJECTION AS NEEDED
Status: DISCONTINUED | OUTPATIENT
Start: 2024-11-18 | End: 2024-11-19 | Stop reason: HOSPADM

## 2024-11-18 RX ORDER — SODIUM CHLORIDE 0.9 % (FLUSH) 0.9 %
10 SYRINGE (ML) INJECTION AS NEEDED
Status: CANCELLED | OUTPATIENT
Start: 2024-11-18

## 2024-11-25 ENCOUNTER — HOSPITAL ENCOUNTER (OUTPATIENT)
Facility: HOSPITAL | Age: 39
Discharge: HOME | End: 2024-11-25
Admitting: INTERNAL MEDICINE
Payer: COMMERCIAL

## 2024-11-25 VITALS
DIASTOLIC BLOOD PRESSURE: 87 MMHG | HEART RATE: 90 BPM | RESPIRATION RATE: 18 BRPM | SYSTOLIC BLOOD PRESSURE: 135 MMHG | OXYGEN SATURATION: 100 % | TEMPERATURE: 97.7 F

## 2024-11-25 DIAGNOSIS — M86.172 ACUTE OSTEOMYELITIS OF LEFT ANKLE OR FOOT: Primary | ICD-10-CM

## 2024-11-25 PROCEDURE — 96523 IRRIG DRUG DELIVERY DEVICE: CPT

## 2024-11-25 RX ORDER — SODIUM CHLORIDE 0.9 % (FLUSH) 0.9 %
10 SYRINGE (ML) INJECTION AS NEEDED
OUTPATIENT
Start: 2024-11-25

## 2024-11-25 RX ORDER — SODIUM CHLORIDE 0.9 % (FLUSH) 0.9 %
10 SYRINGE (ML) INJECTION AS NEEDED
Status: DISCONTINUED | OUTPATIENT
Start: 2024-11-25 | End: 2024-11-26 | Stop reason: HOSPADM

## 2024-12-03 ENCOUNTER — HOSPITAL ENCOUNTER (OUTPATIENT)
Facility: HOSPITAL | Age: 39
Discharge: HOME | End: 2024-12-03
Admitting: INTERNAL MEDICINE
Payer: COMMERCIAL

## 2024-12-03 DIAGNOSIS — M86.172 ACUTE OSTEOMYELITIS OF LEFT ANKLE OR FOOT: Primary | ICD-10-CM

## 2024-12-03 PROCEDURE — 96523 IRRIG DRUG DELIVERY DEVICE: CPT

## 2024-12-03 PROCEDURE — G0463 HOSPITAL OUTPT CLINIC VISIT: HCPCS

## 2024-12-03 RX ORDER — SODIUM CHLORIDE 0.9 % (FLUSH) 0.9 %
10 SYRINGE (ML) INJECTION AS NEEDED
Status: DISCONTINUED | OUTPATIENT
Start: 2024-12-03 | End: 2024-12-04 | Stop reason: HOSPADM

## 2024-12-03 RX ORDER — SODIUM CHLORIDE 0.9 % (FLUSH) 0.9 %
10 SYRINGE (ML) INJECTION AS NEEDED
OUTPATIENT
Start: 2024-12-03

## 2024-12-30 ENCOUNTER — HOSPITAL ENCOUNTER (OUTPATIENT)
Facility: HOSPITAL | Age: 39
Discharge: HOME | End: 2024-12-30
Admitting: INTERNAL MEDICINE
Payer: COMMERCIAL

## 2024-12-30 VITALS
SYSTOLIC BLOOD PRESSURE: 142 MMHG | HEART RATE: 100 BPM | TEMPERATURE: 98 F | RESPIRATION RATE: 16 BRPM | DIASTOLIC BLOOD PRESSURE: 72 MMHG | OXYGEN SATURATION: 92 %

## 2024-12-30 DIAGNOSIS — M86.172 ACUTE OSTEOMYELITIS OF LEFT ANKLE OR FOOT: Primary | ICD-10-CM

## 2024-12-30 LAB
ALBUMIN SERPL-MCNC: 4.1 G/DL (ref 3.5–5.2)
ALBUMIN/GLOB SERPL: 1.2 G/DL
ALP SERPL-CCNC: 88 U/L (ref 39–117)
ALT SERPL W P-5'-P-CCNC: 19 U/L (ref 1–41)
ANION GAP SERPL CALCULATED.3IONS-SCNC: 13.3 MMOL/L (ref 5–15)
APTT PPP: 32.5 SECONDS (ref 23–35)
AST SERPL-CCNC: 40 U/L (ref 1–40)
BASOPHILS # BLD AUTO: 0.04 10*3/MM3 (ref 0–0.2)
BASOPHILS NFR BLD AUTO: 0.4 % (ref 0–1.5)
BILIRUB SERPL-MCNC: 0.4 MG/DL (ref 0–1.2)
BUN SERPL-MCNC: 17 MG/DL (ref 6–20)
BUN/CREAT SERPL: 15.9 (ref 7–25)
CALCIUM SPEC-SCNC: 8.8 MG/DL (ref 8.6–10.5)
CHLORIDE SERPL-SCNC: 100 MMOL/L (ref 98–107)
CO2 SERPL-SCNC: 22.7 MMOL/L (ref 22–29)
CREAT SERPL-MCNC: 1.07 MG/DL (ref 0.76–1.27)
DEPRECATED RDW RBC AUTO: 43.8 FL (ref 37–54)
EGFRCR SERPLBLD CKD-EPI 2021: 89.4 ML/MIN/1.73
EOSINOPHIL # BLD AUTO: 0.11 10*3/MM3 (ref 0–0.4)
EOSINOPHIL NFR BLD AUTO: 1.2 % (ref 0.3–6.2)
ERYTHROCYTE [DISTWIDTH] IN BLOOD BY AUTOMATED COUNT: 13.4 % (ref 12.3–15.4)
GLOBULIN UR ELPH-MCNC: 3.4 GM/DL
GLUCOSE SERPL-MCNC: 113 MG/DL (ref 65–99)
HBA1C MFR BLD: 5.5 % (ref 4.8–5.6)
HCT VFR BLD AUTO: 37.5 % (ref 37.5–51)
HGB BLD-MCNC: 12.5 G/DL (ref 13–17.7)
IMM GRANULOCYTES # BLD AUTO: 0.02 10*3/MM3 (ref 0–0.05)
IMM GRANULOCYTES NFR BLD AUTO: 0.2 % (ref 0–0.5)
INR PPP: 1.26 (ref 0.9–1.1)
LYMPHOCYTES # BLD AUTO: 1.55 10*3/MM3 (ref 0.7–3.1)
LYMPHOCYTES NFR BLD AUTO: 17.3 % (ref 19.6–45.3)
MCH RBC QN AUTO: 29.8 PG (ref 26.6–33)
MCHC RBC AUTO-ENTMCNC: 33.3 G/DL (ref 31.5–35.7)
MCV RBC AUTO: 89.5 FL (ref 79–97)
MONOCYTES # BLD AUTO: 1 10*3/MM3 (ref 0.1–0.9)
MONOCYTES NFR BLD AUTO: 11.1 % (ref 5–12)
NEUTROPHILS NFR BLD AUTO: 6.26 10*3/MM3 (ref 1.7–7)
NEUTROPHILS NFR BLD AUTO: 69.8 % (ref 42.7–76)
NRBC BLD AUTO-RTO: 0 /100 WBC (ref 0–0.2)
PLATELET # BLD AUTO: 214 10*3/MM3 (ref 140–450)
PMV BLD AUTO: 10.1 FL (ref 6–12)
POTASSIUM SERPL-SCNC: 4.7 MMOL/L (ref 3.5–5.2)
PROT SERPL-MCNC: 7.5 G/DL (ref 6–8.5)
PROTHROMBIN TIME: 16.3 SECONDS (ref 12.3–15.1)
RBC # BLD AUTO: 4.19 10*6/MM3 (ref 4.14–5.8)
SODIUM SERPL-SCNC: 136 MMOL/L (ref 136–145)
WBC NRBC COR # BLD AUTO: 8.98 10*3/MM3 (ref 3.4–10.8)

## 2024-12-30 PROCEDURE — 85025 COMPLETE CBC W/AUTO DIFF WBC: CPT | Performed by: INTERNAL MEDICINE

## 2024-12-30 PROCEDURE — 36592 COLLECT BLOOD FROM PICC: CPT

## 2024-12-30 PROCEDURE — 83036 HEMOGLOBIN GLYCOSYLATED A1C: CPT | Performed by: INTERNAL MEDICINE

## 2024-12-30 PROCEDURE — 85610 PROTHROMBIN TIME: CPT | Performed by: INTERNAL MEDICINE

## 2024-12-30 PROCEDURE — 85730 THROMBOPLASTIN TIME PARTIAL: CPT | Performed by: INTERNAL MEDICINE

## 2024-12-30 PROCEDURE — 80053 COMPREHEN METABOLIC PANEL: CPT | Performed by: INTERNAL MEDICINE

## 2024-12-30 RX ORDER — SODIUM CHLORIDE 0.9 % (FLUSH) 0.9 %
10 SYRINGE (ML) INJECTION AS NEEDED
Status: DISCONTINUED | OUTPATIENT
Start: 2024-12-30 | End: 2024-12-31 | Stop reason: HOSPADM

## 2024-12-30 RX ORDER — SODIUM CHLORIDE 0.9 % (FLUSH) 0.9 %
10 SYRINGE (ML) INJECTION AS NEEDED
OUTPATIENT
Start: 2024-12-30

## 2025-01-25 ENCOUNTER — HOSPITAL ENCOUNTER (EMERGENCY)
Facility: HOSPITAL | Age: 40
Discharge: HOME | End: 2025-01-25
Attending: EMERGENCY MEDICINE
Payer: COMMERCIAL

## 2025-01-25 VITALS
DIASTOLIC BLOOD PRESSURE: 97 MMHG | TEMPERATURE: 98.3 F | WEIGHT: 315 LBS | HEIGHT: 78 IN | SYSTOLIC BLOOD PRESSURE: 131 MMHG | BODY MASS INDEX: 36.45 KG/M2 | HEART RATE: 107 BPM | RESPIRATION RATE: 18 BRPM | OXYGEN SATURATION: 96 %

## 2025-01-25 DIAGNOSIS — T82.524A DISPLACEMENT OF INFUSION CATHETER, INITIAL ENCOUNTER: Primary | ICD-10-CM

## 2025-01-25 PROCEDURE — 87070 CULTURE OTHR SPECIMN AEROBIC: CPT | Performed by: NURSE PRACTITIONER

## 2025-01-25 PROCEDURE — 87147 CULTURE TYPE IMMUNOLOGIC: CPT | Performed by: NURSE PRACTITIONER

## 2025-01-25 PROCEDURE — 99283 EMERGENCY DEPT VISIT LOW MDM: CPT | Performed by: EMERGENCY MEDICINE

## 2025-01-25 PROCEDURE — 87186 SC STD MICRODIL/AGAR DIL: CPT | Performed by: NURSE PRACTITIONER

## 2025-01-26 NOTE — ED PROVIDER NOTES
Subjective:  History of Present Illness:    Patient is a 39-year-old male without contributing health history.  Presents to the ER today with PICC line to right upper extremity.  Patient reports that daughter was changing PICC line dressing earlier today and accidentally pulled PICC line custodial out.  Patient reports that PICC line was due to be pulled on Monday.  Is requesting discontinuation of PICC line today.  He denies OTC medication or home remedy.  Denies alleviating or exacerbating factors.    Nurses Notes reviewed and agree, including vitals, allergies, social history and prior medical history.     REVIEW OF SYSTEMS: All systems reviewed and not pertinent unless noted.  Review of Systems   Skin:         PICC line right upper extremity   All other systems reviewed and are negative.      Past Medical History:   Diagnosis Date    Anxiety     Bipolar affective disorder, currently active     ON MEDICATION    DVT (deep venous thrombosis)     RIGHT LEG -2012    Hypertension     PT DENIES ANY HTN.  BUT STATES IT DOES GO UP AT TIMES.    Migraine     Neuropathy     S/P foot surgery, right 2021    Seizure     LAST ONE WAS 4/2017.  STATES NOT ON ANY MEDICATION FOR.      Uses contact lenses        Allergies:    Penicillins and Vancomycin      Past Surgical History:   Procedure Laterality Date    ADENOIDECTOMY      EXCISION MASS LEG Left 5/10/2017    Procedure: Left foot soft tissue mass/plantar fibroma excision;  Surgeon: Lucius Olguin DPM;  Location: [facility];  Service:     TONSILLECTOMY AND ADENOIDECTOMY      WISDOM TOOTH EXTRACTION           Social History     Socioeconomic History    Marital status:    Tobacco Use    Smoking status: Every Day     Current packs/day: 1.00     Average packs/day: 1 pack/day for 36.1 years (36.1 ttl pk-yrs)     Types: Cigarettes     Start date: 2003    Smokeless tobacco: Never   Vaping Use    Vaping status: Never Used   Substance and Sexual Activity    Alcohol use: Not  "Currently     Comment: Pt reports he drank socially    Drug use: Not Currently     Types: Hydrocodone     Comment: previously.  2013 WAS LAST USE.      Sexual activity: Defer         Family History   Problem Relation Age of Onset    Gout Other     Osteoarthritis Other     Rheum arthritis Other     Diabetes Other        Objective  Physical Exam:  /97   Pulse 107   Temp 98.3 °F (36.8 °C) (Oral)   Resp 18   Ht 198.1 cm (78\")   Wt (!) 152 kg (335 lb 1.6 oz)   SpO2 96%   BMI 38.72 kg/m²      Physical Exam  Vitals and nursing note reviewed.   Constitutional:       Appearance: Normal appearance. He is normal weight.   HENT:      Head: Normocephalic and atraumatic.      Nose: Nose normal.      Mouth/Throat:      Mouth: Mucous membranes are moist.      Pharynx: Oropharynx is clear.   Eyes:      Extraocular Movements: Extraocular movements intact.      Conjunctiva/sclera: Conjunctivae normal.      Pupils: Pupils are equal, round, and reactive to light.   Cardiovascular:      Rate and Rhythm: Normal rate and regular rhythm.      Pulses: Normal pulses.      Heart sounds: Normal heart sounds.   Pulmonary:      Effort: Pulmonary effort is normal.      Breath sounds: Normal breath sounds.   Abdominal:      General: Abdomen is flat. Bowel sounds are normal.      Palpations: Abdomen is soft.   Musculoskeletal:         General: Normal range of motion.      Cervical back: Normal range of motion and neck supple.   Skin:     General: Skin is warm and dry.      Capillary Refill: Capillary refill takes less than 2 seconds.      Comments: PICC line in right upper extremity appears to be semidislodged.  There is no erythema or drainage noted.   Neurological:      General: No focal deficit present.      Mental Status: He is alert and oriented to person, place, and time. Mental status is at baseline.   Psychiatric:         Mood and Affect: Mood normal.         Behavior: Behavior normal.         Thought Content: Thought content " normal.         Judgment: Judgment normal.         Procedures    ED Course:         Lab Results (last 24 hours)       Procedure Component Value Units Date/Time    Catheter Culture - Cath Tip, Hand, Right [[id number]] Collected: 01/25/25 1708    Specimen: Cath Tip from Hand, Right Updated: 01/25/25 1711             No radiology results from the last 24 hrs       MDM      Initial impression of presenting illness: Patient is a 39-year-old male without contributing health history.  Presents to the ER today with PICC line to right upper extremity.  Patient reports that daughter was changing PICC line dressing earlier today and accidentally pulled PICC line MCC out.  Patient reports that PICC line was due to be pulled on Monday.  Is requesting discontinuation of PICC line today.  He denies OTC medication or home remedy.  Denies alleviating or exacerbating factors.    DDX: includes but is not limited to: PICC line malfunction    Patient arrives stable with vitals interpreted by myself.     Pertinent features from physical exam: PICC line appears to be displaced.  There is no erythema or drainage..    Initial diagnostic plan: Culture of PICC line tip    Results from initial plan were reviewed and interpreted by me revealing an pending culture    Diagnostic information from other sources: Chart review    Interventions / Re-evaluation: PICC line was removed at bedside per protocol.    Results/clinical rationale were discussed with patient    Consultations/Discussion of results with other physicians: N/A    Disposition plan: Patient is hemodynamically stable nontoxic-appearing appropriate discharge.  Outpatient follow-up with PCP within the week.  Follow-up ER for new or symptoms.  -----        Final diagnoses:   Displacement of infusion catheter, initial encounter          Riccardo Valenzuela, APRN  01/25/25 7150

## 2025-01-28 LAB — CATHETER CULTURE: ABNORMAL

## (undated) DEVICE — BNDG ELAS MATRX V/CLS 6IN 5YD LF

## (undated) DEVICE — BNDG ELAS ELITE V/CLOSE 6IN 5YD LF STRL

## (undated) DEVICE — BANDAGE,GAUZE,BULKEE II,4.5"X4.1YD,STRL: Brand: MEDLINE

## (undated) DEVICE — DISPOSABLE TOURNIQUET CUFF SINGLE BLADDER, SINGLE PORT AND LUER LOCK CONNECTOR: Brand: COLOR CUFF

## (undated) DEVICE — SUT VIC 2/0 SH 27IN

## (undated) DEVICE — TOWEL,OR,DSP,ST,BLUE,STD,4/PK,20PK/CS: Brand: MEDLINE

## (undated) DEVICE — BNDG ELAS ELITE V/CLOSE 4IN 5YD LF STRL

## (undated) DEVICE — SINGLE PORT MANIFOLD: Brand: NEPTUNE 2

## (undated) DEVICE — BANDAGE,GAUZE,CONFORMING,6"X80",STRL,LF: Brand: MEDLINE

## (undated) DEVICE — DRSNG WND GZ CURAD OIL EMULSION 3X3IN STRL

## (undated) DEVICE — SOL IRR H2O BTL 1000ML STRL

## (undated) DEVICE — SUT ETHLN 3/0 PS2 18 IN 1669H

## (undated) DEVICE — PK EXTRM LOWR 20

## (undated) DEVICE — SUT VIC 3/0 SH 27IN J416H

## (undated) DEVICE — GLV SURG BIOGEL SENSR LTX PF SZ7.5

## (undated) DEVICE — SOL IRR NACL 0.9PCT 3000ML

## (undated) DEVICE — SPNG GZ WOVN 4X4IN 12PLY 10/BX STRL

## (undated) DEVICE — SPLNT 1 STEP 4INX30IN

## (undated) DEVICE — BNDG ELAS MATRX V/CLS 4IN 5YD LF

## (undated) DEVICE — GLV SURG SENSICARE W/ALOE PF LF 8 STRL

## (undated) DEVICE — BANDAGE,GAUZE,CONFORMING,4"X75",STRL,LF: Brand: MEDLINE

## (undated) DEVICE — CUFF SCD HEMOFORCE SEQ CALF STD MD